# Patient Record
Sex: MALE | Race: WHITE | NOT HISPANIC OR LATINO | URBAN - METROPOLITAN AREA
[De-identification: names, ages, dates, MRNs, and addresses within clinical notes are randomized per-mention and may not be internally consistent; named-entity substitution may affect disease eponyms.]

---

## 2021-12-01 ENCOUNTER — INPATIENT (INPATIENT)
Facility: HOSPITAL | Age: 71
LOS: 7 days | Discharge: NURSING, REHAB OR LONG-TERM CARE | DRG: 956 | End: 2021-12-09
Attending: SURGERY | Admitting: SURGERY
Payer: MEDICARE

## 2021-12-01 VITALS
OXYGEN SATURATION: 81 % | RESPIRATION RATE: 38 BRPM | HEART RATE: 123 BPM | SYSTOLIC BLOOD PRESSURE: 158 MMHG | DIASTOLIC BLOOD PRESSURE: 84 MMHG

## 2021-12-01 DIAGNOSIS — S72.91XA UNSPECIFIED FRACTURE OF RIGHT FEMUR, INITIAL ENCOUNTER FOR CLOSED FRACTURE: ICD-10-CM

## 2021-12-01 LAB
ANION GAP SERPL CALC-SCNC: 16 MMOL/L — SIGNIFICANT CHANGE UP (ref 5–17)
APPEARANCE UR: CLEAR — SIGNIFICANT CHANGE UP
APTT BLD: 28.4 SEC — SIGNIFICANT CHANGE UP (ref 27.5–35.5)
BILIRUB UR-MCNC: NEGATIVE — SIGNIFICANT CHANGE UP
BUN SERPL-MCNC: 30 MG/DL — HIGH (ref 7–23)
CALCIUM SERPL-MCNC: 9.2 MG/DL — SIGNIFICANT CHANGE UP (ref 8.4–10.5)
CHLORIDE SERPL-SCNC: 94 MMOL/L — LOW (ref 96–108)
CO2 SERPL-SCNC: 21 MMOL/L — LOW (ref 22–31)
COLOR SPEC: YELLOW — SIGNIFICANT CHANGE UP
CREAT SERPL-MCNC: 1.04 MG/DL — SIGNIFICANT CHANGE UP (ref 0.5–1.3)
DIFF PNL FLD: NEGATIVE — SIGNIFICANT CHANGE UP
GAS PNL BLDA: SIGNIFICANT CHANGE UP
GAS PNL BLDA: SIGNIFICANT CHANGE UP
GLUCOSE SERPL-MCNC: 111 MG/DL — HIGH (ref 70–99)
GLUCOSE UR QL: NEGATIVE — SIGNIFICANT CHANGE UP
HCT VFR BLD CALC: 27.8 % — LOW (ref 39–50)
HCT VFR BLD CALC: 32.7 % — LOW (ref 39–50)
HGB BLD-MCNC: 10.8 G/DL — LOW (ref 13–17)
HGB BLD-MCNC: 9.2 G/DL — LOW (ref 13–17)
INR BLD: 0.97 RATIO — SIGNIFICANT CHANGE UP (ref 0.88–1.16)
KETONES UR-MCNC: NEGATIVE — SIGNIFICANT CHANGE UP
LEUKOCYTE ESTERASE UR-ACNC: NEGATIVE — SIGNIFICANT CHANGE UP
MAGNESIUM SERPL-MCNC: 1.9 MG/DL — SIGNIFICANT CHANGE UP (ref 1.6–2.6)
MCHC RBC-ENTMCNC: 27.8 PG — SIGNIFICANT CHANGE UP (ref 27–34)
MCHC RBC-ENTMCNC: 28 PG — SIGNIFICANT CHANGE UP (ref 27–34)
MCHC RBC-ENTMCNC: 33 GM/DL — SIGNIFICANT CHANGE UP (ref 32–36)
MCHC RBC-ENTMCNC: 33.1 GM/DL — SIGNIFICANT CHANGE UP (ref 32–36)
MCV RBC AUTO: 84 FL — SIGNIFICANT CHANGE UP (ref 80–100)
MCV RBC AUTO: 84.7 FL — SIGNIFICANT CHANGE UP (ref 80–100)
NITRITE UR-MCNC: NEGATIVE — SIGNIFICANT CHANGE UP
NRBC # BLD: 0 /100 WBCS — SIGNIFICANT CHANGE UP (ref 0–0)
NRBC # BLD: 0 /100 WBCS — SIGNIFICANT CHANGE UP (ref 0–0)
PH UR: 6 — SIGNIFICANT CHANGE UP (ref 5–8)
PHOSPHATE SERPL-MCNC: 4.3 MG/DL — SIGNIFICANT CHANGE UP (ref 2.5–4.5)
PLATELET # BLD AUTO: 336 K/UL — SIGNIFICANT CHANGE UP (ref 150–400)
PLATELET # BLD AUTO: 381 K/UL — SIGNIFICANT CHANGE UP (ref 150–400)
POTASSIUM SERPL-MCNC: 4.6 MMOL/L — SIGNIFICANT CHANGE UP (ref 3.5–5.3)
POTASSIUM SERPL-SCNC: 4.6 MMOL/L — SIGNIFICANT CHANGE UP (ref 3.5–5.3)
PROT UR-MCNC: ABNORMAL
PROTHROM AB SERPL-ACNC: 11.7 SEC — SIGNIFICANT CHANGE UP (ref 10.6–13.6)
RBC # BLD: 3.31 M/UL — LOW (ref 4.2–5.8)
RBC # BLD: 3.86 M/UL — LOW (ref 4.2–5.8)
RBC # FLD: 16.1 % — HIGH (ref 10.3–14.5)
RBC # FLD: 16.1 % — HIGH (ref 10.3–14.5)
SODIUM SERPL-SCNC: 131 MMOL/L — LOW (ref 135–145)
SP GR SPEC: >1.05 (ref 1.01–1.02)
UROBILINOGEN FLD QL: ABNORMAL
WBC # BLD: 19.82 K/UL — HIGH (ref 3.8–10.5)
WBC # BLD: 22.8 K/UL — HIGH (ref 3.8–10.5)
WBC # FLD AUTO: 19.82 K/UL — HIGH (ref 3.8–10.5)
WBC # FLD AUTO: 22.8 K/UL — HIGH (ref 3.8–10.5)

## 2021-12-01 PROCEDURE — 73551 X-RAY EXAM OF FEMUR 1: CPT | Mod: 26,RT

## 2021-12-01 PROCEDURE — 73501 X-RAY EXAM HIP UNI 1 VIEW: CPT | Mod: 26,RT

## 2021-12-01 PROCEDURE — 71045 X-RAY EXAM CHEST 1 VIEW: CPT | Mod: 26

## 2021-12-01 PROCEDURE — 99223 1ST HOSP IP/OBS HIGH 75: CPT

## 2021-12-01 RX ORDER — IPRATROPIUM BROMIDE 0.2 MG/ML
500 SOLUTION, NON-ORAL INHALATION EVERY 6 HOURS
Refills: 0 | Status: DISCONTINUED | OUTPATIENT
Start: 2021-12-01 | End: 2021-12-02

## 2021-12-01 RX ORDER — LIDOCAINE 4 G/100G
1 CREAM TOPICAL DAILY
Refills: 0 | Status: DISCONTINUED | OUTPATIENT
Start: 2021-12-01 | End: 2021-12-09

## 2021-12-01 RX ORDER — TRAMADOL HYDROCHLORIDE 50 MG/1
25 TABLET ORAL EVERY 6 HOURS
Refills: 0 | Status: DISCONTINUED | OUTPATIENT
Start: 2021-12-01 | End: 2021-12-07

## 2021-12-01 RX ORDER — TRAMADOL HYDROCHLORIDE 50 MG/1
50 TABLET ORAL EVERY 6 HOURS
Refills: 0 | Status: DISCONTINUED | OUTPATIENT
Start: 2021-12-01 | End: 2021-12-07

## 2021-12-01 RX ORDER — DIVALPROEX SODIUM 500 MG/1
1000 TABLET, DELAYED RELEASE ORAL EVERY 24 HOURS
Refills: 0 | Status: DISCONTINUED | OUTPATIENT
Start: 2021-12-01 | End: 2021-12-09

## 2021-12-01 RX ORDER — SODIUM CHLORIDE 9 MG/ML
1000 INJECTION INTRAMUSCULAR; INTRAVENOUS; SUBCUTANEOUS ONCE
Refills: 0 | Status: COMPLETED | OUTPATIENT
Start: 2021-12-01 | End: 2021-12-01

## 2021-12-01 RX ORDER — SODIUM CHLORIDE 9 MG/ML
1000 INJECTION INTRAMUSCULAR; INTRAVENOUS; SUBCUTANEOUS
Refills: 0 | Status: DISCONTINUED | OUTPATIENT
Start: 2021-12-01 | End: 2021-12-02

## 2021-12-01 RX ORDER — ACETAMINOPHEN 500 MG
1000 TABLET ORAL EVERY 6 HOURS
Refills: 0 | Status: COMPLETED | OUTPATIENT
Start: 2021-12-01 | End: 2021-12-02

## 2021-12-01 RX ORDER — BUDESONIDE, MICRONIZED 100 %
0.5 POWDER (GRAM) MISCELLANEOUS EVERY 12 HOURS
Refills: 0 | Status: DISCONTINUED | OUTPATIENT
Start: 2021-12-01 | End: 2021-12-06

## 2021-12-01 RX ORDER — POLYETHYLENE GLYCOL 3350 17 G/17G
17 POWDER, FOR SOLUTION ORAL AT BEDTIME
Refills: 0 | Status: DISCONTINUED | OUTPATIENT
Start: 2021-12-01 | End: 2021-12-02

## 2021-12-01 RX ORDER — HYDROMORPHONE HYDROCHLORIDE 2 MG/ML
0.25 INJECTION INTRAMUSCULAR; INTRAVENOUS; SUBCUTANEOUS ONCE
Refills: 0 | Status: DISCONTINUED | OUTPATIENT
Start: 2021-12-01 | End: 2021-12-01

## 2021-12-01 RX ORDER — IPRATROPIUM/ALBUTEROL SULFATE 18-103MCG
3 AEROSOL WITH ADAPTER (GRAM) INHALATION EVERY 6 HOURS
Refills: 0 | Status: DISCONTINUED | OUTPATIENT
Start: 2021-12-01 | End: 2021-12-02

## 2021-12-01 RX ORDER — SENNA PLUS 8.6 MG/1
1 TABLET ORAL AT BEDTIME
Refills: 0 | Status: DISCONTINUED | OUTPATIENT
Start: 2021-12-01 | End: 2021-12-02

## 2021-12-01 RX ORDER — OLANZAPINE 15 MG/1
15 TABLET, FILM COATED ORAL AT BEDTIME
Refills: 0 | Status: DISCONTINUED | OUTPATIENT
Start: 2021-12-01 | End: 2021-12-09

## 2021-12-01 RX ORDER — SODIUM CHLORIDE 9 MG/ML
1000 INJECTION, SOLUTION INTRAVENOUS
Refills: 0 | Status: DISCONTINUED | OUTPATIENT
Start: 2021-12-01 | End: 2021-12-01

## 2021-12-01 RX ORDER — OLANZAPINE 15 MG/1
1 TABLET, FILM COATED ORAL
Qty: 0 | Refills: 0 | DISCHARGE

## 2021-12-01 RX ORDER — NICOTINE POLACRILEX 2 MG
1 GUM BUCCAL DAILY
Refills: 0 | Status: COMPLETED | OUTPATIENT
Start: 2021-12-01 | End: 2021-12-07

## 2021-12-01 RX ORDER — DOCUSATE SODIUM 100 MG
200 CAPSULE ORAL
Qty: 0 | Refills: 0 | DISCHARGE

## 2021-12-01 RX ORDER — HYDROMORPHONE HYDROCHLORIDE 2 MG/ML
0.25 INJECTION INTRAMUSCULAR; INTRAVENOUS; SUBCUTANEOUS EVERY 4 HOURS
Refills: 0 | Status: DISCONTINUED | OUTPATIENT
Start: 2021-12-01 | End: 2021-12-07

## 2021-12-01 RX ORDER — AMLODIPINE BESYLATE 2.5 MG/1
1 TABLET ORAL
Qty: 0 | Refills: 0 | DISCHARGE

## 2021-12-01 RX ADMIN — Medication 1 PATCH: at 21:03

## 2021-12-01 RX ADMIN — SODIUM CHLORIDE 1000 MILLILITER(S): 9 INJECTION INTRAMUSCULAR; INTRAVENOUS; SUBCUTANEOUS at 21:10

## 2021-12-01 RX ADMIN — SODIUM CHLORIDE 100 MILLILITER(S): 9 INJECTION, SOLUTION INTRAVENOUS at 20:15

## 2021-12-01 RX ADMIN — Medication 3 MILLILITER(S): at 18:47

## 2021-12-01 RX ADMIN — DIVALPROEX SODIUM 1000 MILLIGRAM(S): 500 TABLET, DELAYED RELEASE ORAL at 21:04

## 2021-12-01 RX ADMIN — TRAMADOL HYDROCHLORIDE 50 MILLIGRAM(S): 50 TABLET ORAL at 20:15

## 2021-12-01 RX ADMIN — HYDROMORPHONE HYDROCHLORIDE 0.25 MILLIGRAM(S): 2 INJECTION INTRAMUSCULAR; INTRAVENOUS; SUBCUTANEOUS at 20:30

## 2021-12-01 RX ADMIN — TRAMADOL HYDROCHLORIDE 25 MILLIGRAM(S): 50 TABLET ORAL at 23:45

## 2021-12-01 RX ADMIN — Medication 1000 MILLIGRAM(S): at 23:49

## 2021-12-01 RX ADMIN — Medication 400 MILLIGRAM(S): at 23:44

## 2021-12-01 RX ADMIN — HYDROMORPHONE HYDROCHLORIDE 0.25 MILLIGRAM(S): 2 INJECTION INTRAMUSCULAR; INTRAVENOUS; SUBCUTANEOUS at 20:15

## 2021-12-01 RX ADMIN — OLANZAPINE 15 MILLIGRAM(S): 15 TABLET, FILM COATED ORAL at 21:03

## 2021-12-01 RX ADMIN — LIDOCAINE 1 PATCH: 4 CREAM TOPICAL at 21:10

## 2021-12-01 RX ADMIN — TRAMADOL HYDROCHLORIDE 50 MILLIGRAM(S): 50 TABLET ORAL at 19:44

## 2021-12-01 NOTE — CONSULT NOTE ADULT - ASSESSMENT
Patient is a 71y Male with PMH of Schizophrenia, HTN, current smoker (1/2pk a day), new dx of COPD who was initially admitted to [facility] after a fall on the sun deck at [facility] with cc of hip pain. Patient found to have Right comminuted Intertrochanteric fracture, and right rib fx, with hemopneumothorax and grade 3 liver laceration with hypoxia r/o PE. Patient admit to SICU for hemodynamic monitoring.    Neuro: hx of Schizophrenia  - resume home Olanzaprine, depakote  - acute pain control for rib fx: tylenol, tramadol and dilaudid prn  -monitor mental status    Resp: right rib fx, hypoxic resp failure  -had recent CTA no PE  - cxr daily  - encourage incentive spirometer use  - pulmicort and duoneb  - hypoxic reps failure, on HFNC titrate to O2 between 88-92  -chest PT  -OOB to chair  -HOB >35 degrees    CVS: hx of HTN  -BP borderline  -given 500cc bolus  -Lactic acid clear  -monitor BP HR  -holding home amlodipine and vasotec    GI:   -npo for poss ORIF of right hip   - bowel regimen    /Renal: grade 3 liver lac  - hyponatremia ? pre renal  - given 500cc fluids  -monitor renal function and electrolytes    Heme: grade 3 liver lac   - H/H stable  -will do serial cbc    ID:   - will sent UA  - follow temp and wbc    Endo:   -monitor glucose of bmp    case d/w Dr Perez

## 2021-12-01 NOTE — H&P ADULT - NSHPPHYSICALEXAM_GEN_ALL_CORE
PHYSICAL EXAM:  GENERAL: NAD, well-developed, well nourished  HEAD:  NC/AC  EYES: EOMI, PERRLA, conjunctiva and sclera clear  NECK: Supple, No JVD  CHEST/LUNG: b/l air entry, mild dyspnea  HEART: Regular rate and rhythm. No murmurs, rubs, or gallops.   ABDOMEN: Soft, Nontender, Nondistended. Bowel sounds present  EXTREMITIES:  2+ Peripheral Pulses, No clubbing, cyanosis, or edema  right leg: unable to move 2/2 hip fx, +pulse, no pitting edema  MS: AAOx3  NEUROLOGY: non-focal  SKIN: No rashes or lesions

## 2021-12-01 NOTE — H&P ADULT - HISTORY OF PRESENT ILLNESS
Patient is a 71y Male with PMH of Schizophrenia, HTN, current smoker (1/2pk a day), new dx of COPD who was initially admitted to [facility] after a fall on the sun deck at [facility] with cc of hip pain. Patient found to have Right comminuted Intertrochanteric fracture and was suppose to go for ORIF on 12/2. CXR showed mild right apical pneumothorax, persistent opacities. While on the floor, patient dessated, RRT was called, General surgery was called, patient then had serial CT done which revealed 4 right rib fx, with hemopneumothorax and pneumomediastinum and grade 3 liver laceration and no pulmonary embolism. Patient then transferred to [facility] trauma, for possible IR intervention for grade 3 liver laceration.   Patient transferred  to SICU, hypoxic on HFNC, c/o right hip pain.     Primary Survey:   Airway: intact, HFNC 100%/60L sating 88-95  Breathing: mildly tachypneic, b/l air entry, sats   88-95 on  HFNC 100%/60L  Circulation: mildly tachycardic between 100-105, 2 + pulses in all extremeties    Secondary  Survey:   HEENT: autramatic, normocephalic, no bleeding, non tender over head face  Neck: trachea in midline, no cervical tenderness, no pain to flexion, extension, rotation and side bending  Pulmo: some tendereness to right ribs, no echymosis, b.l breath sounds  Cardiac: tacycardic 100-105, NSR  Abdomen: soft, non tender, not distended, no echymosis  MSK: Right Hip TTP with limited ROM, soft compartments, LLE full ROM, full strenght

## 2021-12-01 NOTE — H&P ADULT - NSHPLABSRESULTS_GEN_ALL_CORE
.  LABS:                         10.8   22.80 )-----------( 381      ( 01 Dec 2021 19:19 )             32.7     12-01    131<L>  |  94<L>  |  30<H>  ----------------------------<  111<H>  4.6   |  21<L>  |  1.04    Ca    9.2      01 Dec 2021 19:19  Phos  4.3     12-01  Mg     1.9     12-01      PT/INR - ( 01 Dec 2021 19:19 )   PT: 11.7 sec;   INR: 0.97 ratio         PTT - ( 01 Dec 2021 19:19 )  PTT:28.4 sec          RADIOLOGY, EKG & ADDITIONAL TESTS: Reviewed.

## 2021-12-01 NOTE — CONSULT NOTE ADULT - SUBJECTIVE AND OBJECTIVE BOX
71yMale c/o R hip pain s/p mechanical fall. Initially presented to OSH and then transferred directly to SICU for respiratory distress. Patient denies head hit or LOC. Patient denies numbness or tingling in the RLE. Patient denies any other injuries.    PMH:  -HTN  -schizophrenia  -COPD, 0.5ppd smoking    PSH: none known    AH: NKDA    Meds: See med rec    T(C): 37.1 (12-01-21 @ 19:00)  HR: 107 (12-01-21 @ 21:00)  BP: 113/71 (12-01-21 @ 21:00)  RR: 28 (12-01-21 @ 21:00)  SpO2: 92% (12-01-21 @ 21:00)    PE  Gen: Laying in bed, alert and oriented, NAD  Resp: Unlabored breathing  RLE: Skin intact, no ecchymosis,   SILT DP/SP/ Adolfo/Saph/Post Tib  +EHL/FHL/TA/Gastroc,   Knee/ankle painless ROM,   hip ROM limited 2/2 pain,  DP+   Soft compartments, no calf ttp   +log roll, + heelstrike    Secondary:  No TTP over bony landmarks, SILT BL, ROM intact BL, distal pulses palpable.                        10.8   22.80 )-----------( 381      ( 01 Dec 2021 19:19 )             32.7     12-01    131<L>  |  94<L>  |  30<H>  ----------------------------<  111<H>  4.6   |  21<L>  |  1.04    Ca    9.2      01 Dec 2021 19:19  Phos  4.3     12-01  Mg     1.9     12-01      PT/INR - ( 01 Dec 2021 19:19 )   PT: 11.7 sec;   INR: 0.97 ratio    PTT - ( 01 Dec 2021 19:19 )  PTT:28.4 sec    Imaging:  XR demonstrating R IT fx      71yMale with R IT fx  - Pain control  - IS  - Continue home medications  - Regular Diet, NPOpMN/IVF  - CBC/BMP/Coags/UA/T+S x2  - EKG/CXR  - Medical clearance prior to planned procedure  - Plan for OR 12/2 for R femoral IMN 71yMale c/o R hip pain s/p mechanical fall. Initially presented to OSH and then transferred directly to SICU for respiratory distress. Patient denies head hit or LOC. Patient denies numbness or tingling in the RLE. Patient denies any other injuries.    PMH:  -HTN  -schizophrenia  -COPD, 0.5ppd smoking    PSH: none known    AH: NKDA    Meds: See med rec    T(C): 37.1 (12-01-21 @ 19:00)  HR: 107 (12-01-21 @ 21:00)  BP: 113/71 (12-01-21 @ 21:00)  RR: 28 (12-01-21 @ 21:00)  SpO2: 92% (12-01-21 @ 21:00)    PE  Gen: Laying in bed, alert and oriented, NAD  Resp: Unlabored breathing  RLE: Skin intact, no ecchymosis,   SILT DP/SP/ Adolfo/Saph/Post Tib  +EHL/FHL/TA/Gastroc,   Knee/ankle painless ROM,   hip ROM limited 2/2 pain,  DP+   Soft compartments, no calf ttp   +log roll, + heelstrike    Secondary:  No TTP over bony landmarks, SILT BL, ROM intact BL, distal pulses palpable.                        10.8   22.80 )-----------( 381      ( 01 Dec 2021 19:19 )             32.7     12-01    131<L>  |  94<L>  |  30<H>  ----------------------------<  111<H>  4.6   |  21<L>  |  1.04    Ca    9.2      01 Dec 2021 19:19  Phos  4.3     12-01  Mg     1.9     12-01      PT/INR - ( 01 Dec 2021 19:19 )   PT: 11.7 sec;   INR: 0.97 ratio    PTT - ( 01 Dec 2021 19:19 )  PTT:28.4 sec    Imaging:  XR demonstrating R IT fx      71yMale with R IT fx  - NWB RLE  - Pain control  - IS  - Continue home medications  - Regular Diet, NPOpMN/IVF  - CBC/BMP/Coags/UA/T+S x2  - EKG/CXR  - Medical clearance prior to planned procedure  - Plan for OR 12/2 for R femoral IMN 71yMale c/o R hip pain s/p mechanical fall. Initially presented to OSH and then transferred directly to SICU for respiratory distress. Patient denies head hit or LOC. Patient denies numbness or tingling in the RLE. Patient denies any other injuries.    PMH:  -HTN  -schizophrenia  -COPD, 0.5ppd smoking    PSH: none known    AH: NKDA    Meds: See med rec    T(C): 37.1 (12-01-21 @ 19:00)  HR: 107 (12-01-21 @ 21:00)  BP: 113/71 (12-01-21 @ 21:00)  RR: 28 (12-01-21 @ 21:00)  SpO2: 92% (12-01-21 @ 21:00)    PE  Gen: Laying in bed, alert and oriented, NAD  Resp: Unlabored breathing  RLE: Skin intact, no ecchymosis, shortened and externally rotated  SILT DP/SP/ Adolfo/Saph/Post Tib  +EHL/FHL/TA/Gastroc,   Knee/ankle painless ROM,   hip ROM limited 2/2 pain,  DP+   Soft compartments, no calf ttp   +log roll, + heelstrike    Secondary:  No TTP over bony landmarks, SILT BL, ROM intact BL, distal pulses palpable.                        10.8   22.80 )-----------( 381      ( 01 Dec 2021 19:19 )             32.7     12-01    131<L>  |  94<L>  |  30<H>  ----------------------------<  111<H>  4.6   |  21<L>  |  1.04    Ca    9.2      01 Dec 2021 19:19  Phos  4.3     12-01  Mg     1.9     12-01      PT/INR - ( 01 Dec 2021 19:19 )   PT: 11.7 sec;   INR: 0.97 ratio    PTT - ( 01 Dec 2021 19:19 )  PTT:28.4 sec    Imaging:  XR demonstrating R IT fx      71yMale with R IT fx  - NWB RLE  - Pain control  - IS  - Continue home medications  - Regular Diet, NPOpMN/IVF  - CBC/BMP/Coags/UA/T+S x2  - EKG/CXR  - Medical clearance prior to planned procedure  - Plan for OR 12/2 for R femoral IMN 71yMale c/o R hip pain s/p mechanical fall. Initially presented to [facility] and then transferred directly to SICU for respiratory distress. Patient denies head hit or LOC. Patient denies numbness or tingling in the RLE. Patient denies any other injuries.    PMH:  -HTN  -schizophrenia  -COPD, 0.5ppd smoking    PSH: none known    AH: NKDA    Meds: See med rec    T(C): 37.1 (12-01-21 @ 19:00)  HR: 107 (12-01-21 @ 21:00)  BP: 113/71 (12-01-21 @ 21:00)  RR: 28 (12-01-21 @ 21:00)  SpO2: 92% (12-01-21 @ 21:00)    PE  Gen: Laying in bed, alert and oriented, NAD  Resp: HFNC  RLE: Skin intact, no ecchymosis, shortened and externally rotated  SILT DP/SP/ Adolfo/Saph/Post Tib  +EHL/FHL/TA/Gastroc,   Knee/ankle painless ROM,   hip ROM limited 2/2 pain,  DP+   Soft compartments, no calf ttp   +log roll, + heelstrike    Secondary:  No TTP over bony landmarks, SILT BL, ROM intact BL, distal pulses palpable.                        10.8   22.80 )-----------( 381      ( 01 Dec 2021 19:19 )             32.7     12-01    131<L>  |  94<L>  |  30<H>  ----------------------------<  111<H>  4.6   |  21<L>  |  1.04    Ca    9.2      01 Dec 2021 19:19  Phos  4.3     12-01  Mg     1.9     12-01      PT/INR - ( 01 Dec 2021 19:19 )   PT: 11.7 sec;   INR: 0.97 ratio    PTT - ( 01 Dec 2021 19:19 )  PTT:28.4 sec    Imaging:  XR demonstrating R IT fx      71yMale with R IT fx    -Medical/SICU management appreciated - please document medical clearance/optimization for OR  Imaging reviewed with R IT fracture  - NWB RLE/Strict Bedrest  - Pain control PRN  - Incentive Spirometry  - Continue home medications  - NPO after midnight except medications for OR; IVF while NPO  Hold DVT ppx after midnight for OR; SCDs okay  - Preop labs/T&S/COVID/EKG/CXR ordered  - Plan for OR 12/2 for R femoral IMN wtih Dr. Powers  Will discuss with attending and advise if any changes to plan

## 2021-12-01 NOTE — CONSULT NOTE ADULT - SUBJECTIVE AND OBJECTIVE BOX
HISTORY OF PRESENT ILLNESS:  Patient is a 71y Male     PAST MEDICAL HISTORY:     PAST SURGICAL HISTORY:     FAMILY HISTORY:     SOCIAL HISTORY:    CODE STATUS:     HOME MEDICATIONS:    ALLERGIES: No Known Allergies      VITAL SIGNS:  T(C): 36.8 (01 Dec 2021 18:20), Max: 36.8 (01 Dec 2021 18:20)  T(F): 98.2 (01 Dec 2021 18:20), Max: 98.2 (01 Dec 2021 18:20)  HR: 123 (01 Dec 2021 18:20) (123 - 123)  BP: 158/84 (01 Dec 2021 18:20) (158/84 - 158/84)  BP(mean): 113 (01 Dec 2021 18:20) (113 - 113)  RR: 38 (01 Dec 2021 18:20) (38 - 38)  SpO2: 81% (01 Dec 2021 18:20) (81% - 81%)      NEURO  Exam:  acetaminophen   IVPB .. 1000 milliGRAM(s) IV Intermittent every 6 hours  diVALproex ER 1000 milliGRAM(s) Oral every 24 hours  OLANZapine 15 milliGRAM(s) Oral at bedtime  traMADol 25 milliGRAM(s) Oral every 6 hours PRN Moderate Pain (4 - 6)  traMADol 50 milliGRAM(s) Oral every 6 hours PRN Severe Pain (7 - 10)      RESPIRATORY  Mechanical Ventilation:     Exam:  albuterol/ipratropium for Nebulization 3 milliLiter(s) Nebulizer every 6 hours  buDESOnide    Inhalation Suspension 0.5 milliGRAM(s) Inhalation every 12 hours  ipratropium    for Nebulization 500 MICROGram(s) Nebulizer every 6 hours      CARDIOVASCULAR    Exam:  Cardiac Rhythm:      GI/NUTRITION  Exam:  Diet:      GENITOURINARY/RENAL  lactated ringers. 1000 milliLiter(s) IV Continuous <Continuous>              [ ] Ybarra catheter, indication: urine output monitoring in critically ill patient    HEMATOLOGIC  [ ] VTE Prophylaxis:        Transfusion: [ ] PRBC	[ ] Platelets	[ ] FFP	[ ] Cryoprecipitate      INFECTIOUS DISEASES    RECENT CULTURES:      ENDOCRINE    CAPILLARY BLOOD GLUCOSE          PATIENT CARE ACCESS DEVICES:  [x ] Peripheral IV  [ ] Central Venous Line	[ ] R	[ ] L	[ ] IJ	[ ] Fem	[ ] SC	Placed:   [ ] Arterial Line		[ ] R	[ ] L	[ ] Fem	[ ] Rad	[ ] Ax	Placed:   [ ] PICC:					[ ] Mediport  [ ] Urinary Catheter, Date Placed:   [x] Necessity of urinary, arterial, and venous catheters discussed    OTHER MEDICATIONS: lidocaine   4% Patch 1 Patch Transdermal daily  nicotine -  14 mG/24Hr(s) Patch 1 patch Transdermal daily      IMAGING STUDIES: HISTORY OF PRESENT ILLNESS:  Patient is a 71y Male with PMH of Schizophrenia, HTN, current smoker (1/2pk a day), new dx of COPD who was initially admitted to [facility] after a fall on the sun deck at [facility] with cc of hip pain. Patient found to have Right comminuted Intertrochanteric fracture and was suppose to go for ORIF on 12/2. CXR showed mild right apical pneumothorax, persistent opacities. While on the floor, patient dessated,     PAST MEDICAL HISTORY:   Schizophrenia  HTN  COPD    PAST SURGICAL HISTORY:   None    FAMILY HISTORY:   none    SOCIAL HISTORY:  single, only family is sister   active smoker 1/2 pk a day for over 25 years  CODE STATUS:     HOME MEDICATIONS:    ALLERGIES: No Known Allergies      VITAL SIGNS:  T(C): 36.8 (01 Dec 2021 18:20), Max: 36.8 (01 Dec 2021 18:20)  T(F): 98.2 (01 Dec 2021 18:20), Max: 98.2 (01 Dec 2021 18:20)  HR: 123 (01 Dec 2021 18:20) (123 - 123)  BP: 158/84 (01 Dec 2021 18:20) (158/84 - 158/84)  BP(mean): 113 (01 Dec 2021 18:20) (113 - 113)  RR: 38 (01 Dec 2021 18:20) (38 - 38)  SpO2: 81% (01 Dec 2021 18:20) (81% - 81%)      NEURO  Exam:  acetaminophen   IVPB .. 1000 milliGRAM(s) IV Intermittent every 6 hours  diVALproex ER 1000 milliGRAM(s) Oral every 24 hours  OLANZapine 15 milliGRAM(s) Oral at bedtime  traMADol 25 milliGRAM(s) Oral every 6 hours PRN Moderate Pain (4 - 6)  traMADol 50 milliGRAM(s) Oral every 6 hours PRN Severe Pain (7 - 10)      RESPIRATORY  Mechanical Ventilation:     Exam:  albuterol/ipratropium for Nebulization 3 milliLiter(s) Nebulizer every 6 hours  buDESOnide    Inhalation Suspension 0.5 milliGRAM(s) Inhalation every 12 hours  ipratropium    for Nebulization 500 MICROGram(s) Nebulizer every 6 hours      CARDIOVASCULAR    Exam:  Cardiac Rhythm:      GI/NUTRITION  Exam:  Diet:      GENITOURINARY/RENAL  lactated ringers. 1000 milliLiter(s) IV Continuous <Continuous>              [ ] Ybarra catheter, indication: urine output monitoring in critically ill patient    HEMATOLOGIC  [ ] VTE Prophylaxis:        Transfusion: [ ] PRBC	[ ] Platelets	[ ] FFP	[ ] Cryoprecipitate      INFECTIOUS DISEASES    RECENT CULTURES:      ENDOCRINE    CAPILLARY BLOOD GLUCOSE          PATIENT CARE ACCESS DEVICES:  [x ] Peripheral IV  [ ] Central Venous Line	[ ] R	[ ] L	[ ] IJ	[ ] Fem	[ ] SC	Placed:   [ ] Arterial Line		[ ] R	[ ] L	[ ] Fem	[ ] Rad	[ ] Ax	Placed:   [ ] PICC:					[ ] Mediport  [ ] Urinary Catheter, Date Placed:   [x] Necessity of urinary, arterial, and venous catheters discussed    OTHER MEDICATIONS: lidocaine   4% Patch 1 Patch Transdermal daily  nicotine -  14 mG/24Hr(s) Patch 1 patch Transdermal daily      IMAGING STUDIES: HISTORY OF PRESENT ILLNESS:  Patient is a 71y Male with PMH of Schizophrenia, HTN, current smoker (1/2pk a day), new dx of COPD who was initially admitted to [facility] after a fall on the sun deck at [facility] with cc of hip pain. Patient found to have Right comminuted Intertrochanteric fracture and was suppose to go for ORIF on 12/2. CXR showed mild right apical pneumothorax, persistent opacities. While on the floor, patient dessated, RRT was called, General surgery was called, patient then had serial CT done which revealed 4 right rib fx, with hemopneumothorax and pneumomediastinum and grade 3 liver laceration and no pulmonary embolism. Patient then transferred to [facility] trauma, for possible IR intervention for grade 3 liver laceration.   Patient admitted to SICU, hypoxic on HFNC, c/o right hip pain.     PAST MEDICAL HISTORY:   Schizophrenia  HTN  COPD    PAST SURGICAL HISTORY:   None    FAMILY HISTORY:   none    SOCIAL HISTORY:  single, only family is sister   active smoker 1/2 pk a day for over 25 years  CODE STATUS:     HOME MEDICATIONS:    ALLERGIES: No Known Allergies      VITAL SIGNS:  T(C): 36.8 (01 Dec 2021 18:20), Max: 36.8 (01 Dec 2021 18:20)  T(F): 98.2 (01 Dec 2021 18:20), Max: 98.2 (01 Dec 2021 18:20)  HR: 123 (01 Dec 2021 18:20) (123 - 123)  BP: 158/84 (01 Dec 2021 18:20) (158/84 - 158/84)  BP(mean): 113 (01 Dec 2021 18:20) (113 - 113)  RR: 38 (01 Dec 2021 18:20) (38 - 38)  SpO2: 81% (01 Dec 2021 18:20) (81% - 81%)      NEURO  Exam: awake and alert  acetaminophen   IVPB .. 1000 milliGRAM(s) IV Intermittent every 6 hours  diVALproex ER 1000 milliGRAM(s) Oral every 24 hours  OLANZapine 15 milliGRAM(s) Oral at bedtime  traMADol 25 milliGRAM(s) Oral every 6 hours PRN Moderate Pain (4 - 6)  traMADol 50 milliGRAM(s) Oral every 6 hours PRN Severe Pain (7 - 10)      RESPIRATORY  Exam:  b/l air entry  albuterol/ipratropium for Nebulization 3 milliLiter(s) Nebulizer every 6 hours  buDESOnide    Inhalation Suspension 0.5 milliGRAM(s) Inhalation every 12 hours  ipratropium    for Nebulization 500 MICROGram(s) Nebulizer every 6 hours      CARDIOVASCULAR  Exam: s1s2, rrr  Cardiac Rhythm: sinus      GI/NUTRITION  Exam: NT  Diet:       GENITOURINARY/RENAL  lactated ringers. 1000 milliLiter(s) IV Continuous <Continuous>              [ ] Ybarra catheter, indication: urine output monitoring in critically ill patient    HEMATOLOGIC  [ ] VTE Prophylaxis:        Transfusion: [ ] PRBC	[ ] Platelets	[ ] FFP	[ ] Cryoprecipitate      INFECTIOUS DISEASES    RECENT CULTURES:      ENDOCRINE    CAPILLARY BLOOD GLUCOSE          PATIENT CARE ACCESS DEVICES:  [x ] Peripheral IV  [ ] Central Venous Line	[ ] R	[ ] L	[ ] IJ	[ ] Fem	[ ] SC	Placed:   [ ] Arterial Line		[ ] R	[ ] L	[ ] Fem	[ ] Rad	[ ] Ax	Placed:   [ ] PICC:					[ ] Mediport  [ ] Urinary Catheter, Date Placed:   [x] Necessity of urinary, arterial, and venous catheters discussed    OTHER MEDICATIONS: lidocaine   4% Patch 1 Patch Transdermal daily  nicotine -  14 mG/24Hr(s) Patch 1 patch Transdermal daily      IMAGING STUDIES: HISTORY OF PRESENT ILLNESS:  Patient is a 71y Male with PMH of Schizophrenia, HTN, current smoker (1/2pk a day), new dx of COPD who was initially admitted to [facility] after a fall on the sun deck at [facility] with cc of hip pain. Patient found to have Right comminuted Intertrochanteric fracture and was suppose to go for ORIF on 12/2. CXR showed mild right apical pneumothorax, persistent opacities. While on the floor, patient dessated, RRT was called, General surgery was called, patient then had serial CT done which revealed 4 right rib fx, with hemopneumothorax and pneumomediastinum and grade 3 liver laceration and no pulmonary embolism. Patient then transferred to [facility] trauma, for possible IR intervention for grade 3 liver laceration.   Patient admitted to SICU, hypoxic on HFNC, c/o right hip pain.     PAST MEDICAL HISTORY:   Schizophrenia  HTN  COPD    PAST SURGICAL HISTORY:   None    FAMILY HISTORY:   none    SOCIAL HISTORY:  single, only family is sister   active smoker 1/2 pk a day for over 25 years  CODE STATUS:     HOME MEDICATIONS:    ALLERGIES: No Known Allergies      VITAL SIGNS:  T(C): 36.8 (01 Dec 2021 18:20), Max: 36.8 (01 Dec 2021 18:20)  T(F): 98.2 (01 Dec 2021 18:20), Max: 98.2 (01 Dec 2021 18:20)  HR: 123 (01 Dec 2021 18:20) (123 - 123)  BP: 158/84 (01 Dec 2021 18:20) (158/84 - 158/84)  BP(mean): 113 (01 Dec 2021 18:20) (113 - 113)  RR: 38 (01 Dec 2021 18:20) (38 - 38)  SpO2: 81% (01 Dec 2021 18:20) (81% - 81%)      NEURO  Exam: awake and alert  acetaminophen   IVPB .. 1000 milliGRAM(s) IV Intermittent every 6 hours  diVALproex ER 1000 milliGRAM(s) Oral every 24 hours  OLANZapine 15 milliGRAM(s) Oral at bedtime  traMADol 25 milliGRAM(s) Oral every 6 hours PRN Moderate Pain (4 - 6)  traMADol 50 milliGRAM(s) Oral every 6 hours PRN Severe Pain (7 - 10)      RESPIRATORY  Exam:  b/l air entry  albuterol/ipratropium for Nebulization 3 milliLiter(s) Nebulizer every 6 hours  buDESOnide    Inhalation Suspension 0.5 milliGRAM(s) Inhalation every 12 hours  ipratropium    for Nebulization 500 MICROGram(s) Nebulizer every 6 hours      CARDIOVASCULAR  Exam: s1s2, rrr  Cardiac Rhythm: sinus      GI/NUTRITION  Exam: NT  Diet: npo      GENITOURINARY/RENAL  lactated ringers. 1000 milliLiter(s) IV Continuous <Continuous>        [ ] Ybarra catheter, indication: urine output monitoring in critically ill patient    HEMATOLOGIC  [ ] VTE Prophylaxis:        Transfusion: [ ] PRBC	[ ] Platelets	[ ] FFP	[ ] Cryoprecipitate      INFECTIOUS DISEASES    RECENT CULTURES:      ENDOCRINE    CAPILLARY BLOOD GLUCOSE          PATIENT CARE ACCESS DEVICES:  [x ] Peripheral IV  [ ] Central Venous Line	[ ] R	[ ] L	[ ] IJ	[ ] Fem	[ ] SC	Placed:   [ ] Arterial Line		[ ] R	[ ] L	[ ] Fem	[ ] Rad	[ ] Ax	Placed:   [ ] PICC:					[ ] Mediport  [ ] Urinary Catheter, Date Placed:   [x] Necessity of urinary, arterial, and venous catheters discussed    OTHER MEDICATIONS: lidocaine   4% Patch 1 Patch Transdermal daily  nicotine -  14 mG/24Hr(s) Patch 1 patch Transdermal daily      IMAGING STUDIES:

## 2021-12-01 NOTE — H&P ADULT - ASSESSMENT
Patient is a 71y Male with PMH of Schizophrenia, HTN, current smoker (1/2pk a day), new dx of COPD who was initially admitted to [facility] after a fall on the sun deck at [facility] with cc of hip pain. Patient found to have Right comminuted Intertrochanteric fracture and was suppose to go for ORIF on 12/2. CXR showed mild right apical pneumothorax, persistent opacities. While on the floor, patient dessated, RRT was called, General surgery was called, patient then had serial CT done which revealed 4 right rib fx, with hemopneumothorax and pneumomediastinum and grade 3 liver laceration and no pulmonary embolism. Patient then transferred to [facility] trauma, for possible IR intervention for grade 3 liver laceration.     - - Admit to trauma in SICU, Dr. Berry  - F/u trauma pan-CT scans, sent over from [facility]  - serial cbc for liver laceration  - continue HFNC for hypoxia  - pain control for rib fx  - will call ortho  - Full plan pending results of imaging  - Tertiary survey

## 2021-12-02 DIAGNOSIS — I10 ESSENTIAL (PRIMARY) HYPERTENSION: ICD-10-CM

## 2021-12-02 DIAGNOSIS — J96.01 ACUTE RESPIRATORY FAILURE WITH HYPOXIA: ICD-10-CM

## 2021-12-02 DIAGNOSIS — S72.141A DISPLACED INTERTROCHANTERIC FRACTURE OF RIGHT FEMUR, INITIAL ENCOUNTER FOR CLOSED FRACTURE: ICD-10-CM

## 2021-12-02 DIAGNOSIS — E87.1 HYPO-OSMOLALITY AND HYPONATREMIA: ICD-10-CM

## 2021-12-02 DIAGNOSIS — F20.9 SCHIZOPHRENIA, UNSPECIFIED: ICD-10-CM

## 2021-12-02 DIAGNOSIS — S36.113A LACERATION OF LIVER, UNSPECIFIED DEGREE, INITIAL ENCOUNTER: ICD-10-CM

## 2021-12-02 LAB
ALBUMIN SERPL ELPH-MCNC: 3.1 G/DL — LOW (ref 3.3–5)
ALP SERPL-CCNC: 59 U/L — SIGNIFICANT CHANGE UP (ref 40–120)
ALT FLD-CCNC: 22 U/L — SIGNIFICANT CHANGE UP (ref 10–45)
ANION GAP SERPL CALC-SCNC: 11 MMOL/L — SIGNIFICANT CHANGE UP (ref 5–17)
APTT BLD: 28.9 SEC — SIGNIFICANT CHANGE UP (ref 27.5–35.5)
AST SERPL-CCNC: 26 U/L — SIGNIFICANT CHANGE UP (ref 10–40)
BILIRUB DIRECT SERPL-MCNC: 0.2 MG/DL — SIGNIFICANT CHANGE UP (ref 0–0.3)
BILIRUB INDIRECT FLD-MCNC: 0.2 MG/DL — SIGNIFICANT CHANGE UP (ref 0.2–1)
BILIRUB SERPL-MCNC: 0.4 MG/DL — SIGNIFICANT CHANGE UP (ref 0.2–1.2)
BLD GP AB SCN SERPL QL: NEGATIVE — SIGNIFICANT CHANGE UP
BUN SERPL-MCNC: 30 MG/DL — HIGH (ref 7–23)
CALCIUM SERPL-MCNC: 8.4 MG/DL — SIGNIFICANT CHANGE UP (ref 8.4–10.5)
CHLORIDE SERPL-SCNC: 98 MMOL/L — SIGNIFICANT CHANGE UP (ref 96–108)
CK SERPL-CCNC: 415 U/L — HIGH (ref 30–200)
CO2 SERPL-SCNC: 20 MMOL/L — LOW (ref 22–31)
CREAT ?TM UR-MCNC: 93 MG/DL — SIGNIFICANT CHANGE UP
CREAT SERPL-MCNC: 0.96 MG/DL — SIGNIFICANT CHANGE UP (ref 0.5–1.3)
GAS PNL BLDA: SIGNIFICANT CHANGE UP
GAS PNL BLDA: SIGNIFICANT CHANGE UP
GLUCOSE SERPL-MCNC: 117 MG/DL — HIGH (ref 70–99)
GRAM STN FLD: SIGNIFICANT CHANGE UP
HCT VFR BLD CALC: 26.8 % — LOW (ref 39–50)
HCT VFR BLD CALC: 27 % — LOW (ref 39–50)
HCV AB S/CO SERPL IA: 0.09 S/CO — SIGNIFICANT CHANGE UP (ref 0–0.99)
HCV AB SERPL-IMP: SIGNIFICANT CHANGE UP
HGB BLD-MCNC: 8.9 G/DL — LOW (ref 13–17)
HGB BLD-MCNC: 9.1 G/DL — LOW (ref 13–17)
INR BLD: 1.03 RATIO — SIGNIFICANT CHANGE UP (ref 0.88–1.16)
MAGNESIUM SERPL-MCNC: 1.8 MG/DL — SIGNIFICANT CHANGE UP (ref 1.6–2.6)
MCHC RBC-ENTMCNC: 27.9 PG — SIGNIFICANT CHANGE UP (ref 27–34)
MCHC RBC-ENTMCNC: 28.4 PG — SIGNIFICANT CHANGE UP (ref 27–34)
MCHC RBC-ENTMCNC: 33.2 GM/DL — SIGNIFICANT CHANGE UP (ref 32–36)
MCHC RBC-ENTMCNC: 33.7 GM/DL — SIGNIFICANT CHANGE UP (ref 32–36)
MCV RBC AUTO: 84 FL — SIGNIFICANT CHANGE UP (ref 80–100)
MCV RBC AUTO: 84.4 FL — SIGNIFICANT CHANGE UP (ref 80–100)
NRBC # BLD: 0 /100 WBCS — SIGNIFICANT CHANGE UP (ref 0–0)
NRBC # BLD: 0 /100 WBCS — SIGNIFICANT CHANGE UP (ref 0–0)
OSMOLALITY SERPL: 281 MOSMOL/KG — SIGNIFICANT CHANGE UP (ref 280–301)
OSMOLALITY UR: 582 MOS/KG — SIGNIFICANT CHANGE UP (ref 300–900)
PHOSPHATE SERPL-MCNC: 3.9 MG/DL — SIGNIFICANT CHANGE UP (ref 2.5–4.5)
PLATELET # BLD AUTO: 327 K/UL — SIGNIFICANT CHANGE UP (ref 150–400)
PLATELET # BLD AUTO: 336 K/UL — SIGNIFICANT CHANGE UP (ref 150–400)
POTASSIUM SERPL-MCNC: 4.5 MMOL/L — SIGNIFICANT CHANGE UP (ref 3.5–5.3)
POTASSIUM SERPL-SCNC: 4.5 MMOL/L — SIGNIFICANT CHANGE UP (ref 3.5–5.3)
PROT SERPL-MCNC: 5.7 G/DL — LOW (ref 6–8.3)
PROTHROM AB SERPL-ACNC: 12.3 SEC — SIGNIFICANT CHANGE UP (ref 10.6–13.6)
RBC # BLD: 3.19 M/UL — LOW (ref 4.2–5.8)
RBC # BLD: 3.2 M/UL — LOW (ref 4.2–5.8)
RBC # FLD: 16 % — HIGH (ref 10.3–14.5)
RBC # FLD: 16.1 % — HIGH (ref 10.3–14.5)
RH IG SCN BLD-IMP: POSITIVE — SIGNIFICANT CHANGE UP
SARS-COV-2 RNA SPEC QL NAA+PROBE: SIGNIFICANT CHANGE UP
SODIUM SERPL-SCNC: 129 MMOL/L — LOW (ref 135–145)
SODIUM UR-SCNC: 9 MMOL/L — SIGNIFICANT CHANGE UP
SPECIMEN SOURCE: SIGNIFICANT CHANGE UP
WBC # BLD: 19.36 K/UL — HIGH (ref 3.8–10.5)
WBC # BLD: 19.77 K/UL — HIGH (ref 3.8–10.5)
WBC # FLD AUTO: 19.36 K/UL — HIGH (ref 3.8–10.5)
WBC # FLD AUTO: 19.77 K/UL — HIGH (ref 3.8–10.5)

## 2021-12-02 PROCEDURE — 72125 CT NECK SPINE W/O DYE: CPT | Mod: 26

## 2021-12-02 PROCEDURE — 99223 1ST HOSP IP/OBS HIGH 75: CPT

## 2021-12-02 PROCEDURE — 93010 ELECTROCARDIOGRAM REPORT: CPT

## 2021-12-02 PROCEDURE — 70450 CT HEAD/BRAIN W/O DYE: CPT | Mod: 26

## 2021-12-02 PROCEDURE — 73501 X-RAY EXAM HIP UNI 1 VIEW: CPT | Mod: 26,RT

## 2021-12-02 PROCEDURE — 74177 CT ABD & PELVIS W/CONTRAST: CPT | Mod: 26

## 2021-12-02 PROCEDURE — 99233 SBSQ HOSP IP/OBS HIGH 50: CPT

## 2021-12-02 PROCEDURE — 73551 X-RAY EXAM OF FEMUR 1: CPT | Mod: 26,RT

## 2021-12-02 PROCEDURE — 71045 X-RAY EXAM CHEST 1 VIEW: CPT | Mod: 26

## 2021-12-02 PROCEDURE — 71260 CT THORAX DX C+: CPT | Mod: 26

## 2021-12-02 RX ORDER — AZITHROMYCIN 500 MG/1
500 TABLET, FILM COATED ORAL ONCE
Refills: 0 | Status: DISCONTINUED | OUTPATIENT
Start: 2021-12-02 | End: 2021-12-02

## 2021-12-02 RX ORDER — CEFTRIAXONE 500 MG/1
1000 INJECTION, POWDER, FOR SOLUTION INTRAMUSCULAR; INTRAVENOUS ONCE
Refills: 0 | Status: COMPLETED | OUTPATIENT
Start: 2021-12-02 | End: 2021-12-02

## 2021-12-02 RX ORDER — IPRATROPIUM/ALBUTEROL SULFATE 18-103MCG
3 AEROSOL WITH ADAPTER (GRAM) INHALATION ONCE
Refills: 0 | Status: COMPLETED | OUTPATIENT
Start: 2021-12-02 | End: 2021-12-02

## 2021-12-02 RX ORDER — CEFTRIAXONE 500 MG/1
1000 INJECTION, POWDER, FOR SOLUTION INTRAMUSCULAR; INTRAVENOUS EVERY 24 HOURS
Refills: 0 | Status: DISCONTINUED | OUTPATIENT
Start: 2021-12-03 | End: 2021-12-04

## 2021-12-02 RX ORDER — AZITHROMYCIN 500 MG/1
250 TABLET, FILM COATED ORAL EVERY 24 HOURS
Refills: 0 | Status: DISCONTINUED | OUTPATIENT
Start: 2021-12-03 | End: 2021-12-04

## 2021-12-02 RX ORDER — IPRATROPIUM/ALBUTEROL SULFATE 18-103MCG
3 AEROSOL WITH ADAPTER (GRAM) INHALATION EVERY 4 HOURS
Refills: 0 | Status: DISCONTINUED | OUTPATIENT
Start: 2021-12-02 | End: 2021-12-06

## 2021-12-02 RX ORDER — ENOXAPARIN SODIUM 100 MG/ML
40 INJECTION SUBCUTANEOUS EVERY 24 HOURS
Refills: 0 | Status: DISCONTINUED | OUTPATIENT
Start: 2021-12-02 | End: 2021-12-04

## 2021-12-02 RX ORDER — CHLORHEXIDINE GLUCONATE 213 G/1000ML
1 SOLUTION TOPICAL DAILY
Refills: 0 | Status: DISCONTINUED | OUTPATIENT
Start: 2021-12-02 | End: 2021-12-02

## 2021-12-02 RX ORDER — AZITHROMYCIN 500 MG/1
500 TABLET, FILM COATED ORAL ONCE
Refills: 0 | Status: COMPLETED | OUTPATIENT
Start: 2021-12-02 | End: 2021-12-02

## 2021-12-02 RX ORDER — CEFTRIAXONE 500 MG/1
INJECTION, POWDER, FOR SOLUTION INTRAMUSCULAR; INTRAVENOUS
Refills: 0 | Status: DISCONTINUED | OUTPATIENT
Start: 2021-12-02 | End: 2021-12-04

## 2021-12-02 RX ORDER — CHLORHEXIDINE GLUCONATE 213 G/1000ML
1 SOLUTION TOPICAL
Refills: 0 | Status: DISCONTINUED | OUTPATIENT
Start: 2021-12-03 | End: 2021-12-09

## 2021-12-02 RX ORDER — ACETAMINOPHEN 500 MG
1000 TABLET ORAL EVERY 6 HOURS
Refills: 0 | Status: COMPLETED | OUTPATIENT
Start: 2021-12-02 | End: 2021-12-05

## 2021-12-02 RX ORDER — SODIUM CHLORIDE 9 MG/ML
1000 INJECTION, SOLUTION INTRAVENOUS
Refills: 0 | Status: DISCONTINUED | OUTPATIENT
Start: 2021-12-02 | End: 2021-12-03

## 2021-12-02 RX ORDER — AZITHROMYCIN 500 MG/1
TABLET, FILM COATED ORAL
Refills: 0 | Status: DISCONTINUED | OUTPATIENT
Start: 2021-12-02 | End: 2021-12-04

## 2021-12-02 RX ORDER — SENNA PLUS 8.6 MG/1
2 TABLET ORAL AT BEDTIME
Refills: 0 | Status: DISCONTINUED | OUTPATIENT
Start: 2021-12-02 | End: 2021-12-09

## 2021-12-02 RX ORDER — POLYETHYLENE GLYCOL 3350 17 G/17G
17 POWDER, FOR SOLUTION ORAL DAILY
Refills: 0 | Status: DISCONTINUED | OUTPATIENT
Start: 2021-12-02 | End: 2021-12-06

## 2021-12-02 RX ORDER — HYDROCORTISONE 20 MG
20 TABLET ORAL EVERY 8 HOURS
Refills: 0 | Status: DISCONTINUED | OUTPATIENT
Start: 2021-12-02 | End: 2021-12-02

## 2021-12-02 RX ADMIN — Medication 1 PATCH: at 22:31

## 2021-12-02 RX ADMIN — SENNA PLUS 2 TABLET(S): 8.6 TABLET ORAL at 22:39

## 2021-12-02 RX ADMIN — Medication 1 PATCH: at 19:06

## 2021-12-02 RX ADMIN — Medication 3 MILLILITER(S): at 00:15

## 2021-12-02 RX ADMIN — TRAMADOL HYDROCHLORIDE 50 MILLIGRAM(S): 50 TABLET ORAL at 03:00

## 2021-12-02 RX ADMIN — Medication 400 MILLIGRAM(S): at 06:05

## 2021-12-02 RX ADMIN — TRAMADOL HYDROCHLORIDE 25 MILLIGRAM(S): 50 TABLET ORAL at 00:30

## 2021-12-02 RX ADMIN — Medication 3 MILLILITER(S): at 17:38

## 2021-12-02 RX ADMIN — LIDOCAINE 1 PATCH: 4 CREAM TOPICAL at 22:40

## 2021-12-02 RX ADMIN — Medication 0.5 MILLIGRAM(S): at 17:38

## 2021-12-02 RX ADMIN — HYDROMORPHONE HYDROCHLORIDE 0.25 MILLIGRAM(S): 2 INJECTION INTRAMUSCULAR; INTRAVENOUS; SUBCUTANEOUS at 14:52

## 2021-12-02 RX ADMIN — DIVALPROEX SODIUM 1000 MILLIGRAM(S): 500 TABLET, DELAYED RELEASE ORAL at 20:06

## 2021-12-02 RX ADMIN — Medication 3 MILLILITER(S): at 14:28

## 2021-12-02 RX ADMIN — Medication 500 MICROGRAM(S): at 06:40

## 2021-12-02 RX ADMIN — TRAMADOL HYDROCHLORIDE 50 MILLIGRAM(S): 50 TABLET ORAL at 09:41

## 2021-12-02 RX ADMIN — Medication 400 MILLIGRAM(S): at 11:38

## 2021-12-02 RX ADMIN — Medication 1000 MILLIGRAM(S): at 06:05

## 2021-12-02 RX ADMIN — HYDROMORPHONE HYDROCHLORIDE 0.25 MILLIGRAM(S): 2 INJECTION INTRAMUSCULAR; INTRAVENOUS; SUBCUTANEOUS at 14:37

## 2021-12-02 RX ADMIN — Medication 1000 MILLIGRAM(S): at 11:53

## 2021-12-02 RX ADMIN — LIDOCAINE 1 PATCH: 4 CREAM TOPICAL at 08:36

## 2021-12-02 RX ADMIN — LIDOCAINE 1 PATCH: 4 CREAM TOPICAL at 07:07

## 2021-12-02 RX ADMIN — Medication 1 PATCH: at 07:07

## 2021-12-02 RX ADMIN — Medication 500 MICROGRAM(S): at 00:23

## 2021-12-02 RX ADMIN — CHLORHEXIDINE GLUCONATE 1 APPLICATION(S): 213 SOLUTION TOPICAL at 13:35

## 2021-12-02 RX ADMIN — Medication 1000 MILLIGRAM(S): at 17:56

## 2021-12-02 RX ADMIN — Medication 3 MILLILITER(S): at 23:38

## 2021-12-02 RX ADMIN — Medication 3 MILLILITER(S): at 04:28

## 2021-12-02 RX ADMIN — SODIUM CHLORIDE 75 MILLILITER(S): 9 INJECTION, SOLUTION INTRAVENOUS at 20:06

## 2021-12-02 RX ADMIN — Medication 1 PATCH: at 21:28

## 2021-12-02 RX ADMIN — Medication 0.5 MILLIGRAM(S): at 06:40

## 2021-12-02 RX ADMIN — Medication 3 MILLILITER(S): at 06:40

## 2021-12-02 RX ADMIN — AZITHROMYCIN 500 MILLIGRAM(S): 500 TABLET, FILM COATED ORAL at 17:58

## 2021-12-02 RX ADMIN — Medication 20 MILLIGRAM(S): at 16:45

## 2021-12-02 RX ADMIN — CEFTRIAXONE 100 MILLIGRAM(S): 500 INJECTION, POWDER, FOR SOLUTION INTRAMUSCULAR; INTRAVENOUS at 16:30

## 2021-12-02 RX ADMIN — Medication 400 MILLIGRAM(S): at 17:41

## 2021-12-02 RX ADMIN — TRAMADOL HYDROCHLORIDE 50 MILLIGRAM(S): 50 TABLET ORAL at 09:11

## 2021-12-02 RX ADMIN — ENOXAPARIN SODIUM 40 MILLIGRAM(S): 100 INJECTION SUBCUTANEOUS at 13:35

## 2021-12-02 RX ADMIN — OLANZAPINE 15 MILLIGRAM(S): 15 TABLET, FILM COATED ORAL at 22:30

## 2021-12-02 RX ADMIN — Medication 3 MILLILITER(S): at 10:34

## 2021-12-02 RX ADMIN — TRAMADOL HYDROCHLORIDE 50 MILLIGRAM(S): 50 TABLET ORAL at 03:45

## 2021-12-02 NOTE — CONSULT NOTE ADULT - SUBJECTIVE AND OBJECTIVE BOX
TRAUMA COMANAGEMENT MEDICINE ATTENDING INITIAL CONSULT NOTE    HPI:  Patient is a 71y Male with PMH of Schizophrenia, HTN, current smoker (1/2pk a day), COPD who was initially admitted to [facility] after a fall on the sun deck at [facility] with cc of hip pain. Patient found to have Right comminuted Intertrochanteric fracture and was suppose to go for ORIF on 12/2. CXR showed mild right apical pneumothorax, persistent opacities. While on the floor, patient dessated, RRT was called, General surgery was called, patient then had serial CT done which revealed 4 right rib fx, with hemopneumothorax and pneumomediastinum and grade 3 liver laceration and no pulmonary embolism. Patient then transferred to [facility] trauma, for possible IR intervention for grade 3 liver laceration.   Patient transferred  to SICU, hypoxic on HFNC, c/o right hip pain.       SUBJECTIVE: Seen at bedside. On HFNC.      Home Medications:  amLODIPine 10 mg oral tablet: 1 tab(s) orally once a day (01 Dec 2021 22:49)  cloNIDine 0.3 mg oral tablet: 0.3 milligram(s) orally 3 times a day (01 Dec 2021 22:51)  Colace: 200 milligram(s) orally once a day (at bedtime) (01 Dec 2021 22:52)  Depakote ER: 1000 milligram(s) orally once a day (01 Dec 2021 22:49)  Dyazide 25 mg-37.5 mg oral capsule: 1 cap(s) orally once a day (01 Dec 2021 22:51)  enalapril: 40 milligram(s) orally once a day (01 Dec 2021 22:50)  OLANZapine 15 mg oral tablet: 1 tab(s) orally once a day (01 Dec 2021 22:50)      PAST MEDICAL & SURGICAL HISTORY:      Review of Systems:   CONSTITUTIONAL: No fever, weight loss, or fatigue  EYES: No eye pain, visual disturbances, or discharge  ENMT:  No difficulty hearing, tinnitus, vertigo; No sinus or throat pain  NECK: No pain or stiffness  RESPIRATORY: + cough,  CARDIOVASCULAR: No chest pain, palpitations, dizziness, or leg swelling  GASTROINTESTINAL: No abdominal or epigastric pain. No nausea, vomiting, or hematemesis; No diarrhea or constipation. No melena or hematochezia.  GENITOURINARY: No dysuria, frequency, hematuria, or incontinence  NEUROLOGICAL: No headaches, memory loss, loss of strength, numbness, or tremors  SKIN: No itching, burning, rashes, or lesions   ENDOCRINE: No heat or cold intolerance; No hair loss  MUSCULOSKELETAL: +HIP pain  PSYCHIATRIC: No depression, anxiety, mood swings, or difficulty sleeping      Allergies    No Known Allergies    Intolerances        Social History: Current smoker    FAMILY HISTORY:   No    Vital Signs Last 24 Hrs  T(C): 36.6 (02 Dec 2021 11:00), Max: 37.1 (01 Dec 2021 19:00)  T(F): 97.9 (02 Dec 2021 11:00), Max: 98.7 (01 Dec 2021 19:00)  HR: 102 (02 Dec 2021 14:00) (81 - 126)  BP: 140/74 (02 Dec 2021 14:00) (104/65 - 158/84)  BP(mean): 99 (02 Dec 2021 14:00) (76 - 113)  RR: 23 (02 Dec 2021 14:00) (19 - 43)  SpO2: 95% (02 Dec 2021 14:00) (79% - 97%)  CAPILLARY BLOOD GLUCOSE          PHYSICAL EXAM:  GENERAL: NAD, well-developed  EYES: EOMI  NECK: C-collar  CHEST/LUNG: Clear to auscultation bilaterally; No wheeze  HEART: Reg rate. No M/R/G.  ABDOMEN: Soft, NT/ND, Bowel sounds present  EXTREMITIES:  2+ Peripheral Pulses, No clubbing, cyanosis, or edema  PSYCH: AAOx3  NEUROLOGY: non-focal  SKIN: No rashes or lesions    LABS:                        8.9    19.36 )-----------( 336      ( 02 Dec 2021 11:46 )             26.8     12-01    129<L>  |  98  |  30<H>  ----------------------------<  117<H>  4.5   |  20<L>  |  0.96    Ca    8.4      01 Dec 2021 23:44  Phos  3.9     12-01  Mg     1.8     12-01    TPro  5.7<L>  /  Alb  3.1<L>  /  TBili  0.4  /  DBili  0.2  /  AST  26  /  ALT  22  /  AlkPhos  59  12-01    PT/INR - ( 01 Dec 2021 23:44 )   PT: 12.3 sec;   INR: 1.03 ratio         PTT - ( 01 Dec 2021 23:44 )  PTT:28.9 sec  CARDIAC MARKERS ( 01 Dec 2021 23:44 )  x     / x     / 415 U/L / x     / x          Urinalysis Basic - ( 01 Dec 2021 23:45 )    Color: Yellow / Appearance: Clear / SG: >1.050 / pH: x  Gluc: x / Ketone: Negative  / Bili: Negative / Urobili: 2 mg/dL   Blood: x / Protein: Trace / Nitrite: Negative   Leuk Esterase: Negative / RBC: 1 /hpf / WBC 1 /HPF   Sq Epi: x / Non Sq Epi: 1 /hpf / Bacteria: Negative          MEDICATIONS  (STANDING):  acetaminophen   IVPB .. 1000 milliGRAM(s) IV Intermittent every 6 hours  albuterol/ipratropium for Nebulization 3 milliLiter(s) Nebulizer every 4 hours  buDESOnide    Inhalation Suspension 0.5 milliGRAM(s) Inhalation every 12 hours  chlorhexidine 2% Cloths 1 Application(s) Topical daily  diVALproex ER 1000 milliGRAM(s) Oral every 24 hours  enoxaparin Injectable 40 milliGRAM(s) SubCutaneous every 24 hours  ipratropium    for Nebulization 500 MICROGram(s) Nebulizer every 6 hours  lidocaine   4% Patch 1 Patch Transdermal daily  nicotine -  14 mG/24Hr(s) Patch 1 patch Transdermal daily  OLANZapine 15 milliGRAM(s) Oral at bedtime  polyethylene glycol 3350 17 Gram(s) Oral at bedtime  senna 1 Tablet(s) Oral at bedtime    MEDICATIONS  (PRN):  HYDROmorphone  Injectable 0.25 milliGRAM(s) IV Push every 4 hours PRN breakthrough pain  traMADol 25 milliGRAM(s) Oral every 6 hours PRN Moderate Pain (4 - 6)  traMADol 50 milliGRAM(s) Oral every 6 hours PRN Severe Pain (7 - 10)    Imaging:   EXAM:  CT ABDOMEN AND PELVIS IC                          EXAM:  CT CHEST IC                            PROCEDURE DATE:  12/02/2021            INTERPRETATION:  CLINICAL INFORMATION: Trauma transfer, fall, grade 3 liver laceration, pneumothorax, right femur fracture.    COMPARISON: None.    CONTRAST/COMPLICATIONS:  IV Contrast: Omnipaque 350 (accession [id number]), IV contrast documented in associated exam (accession [id number])  90 cc administered   10 cc discarded  Oral Contrast: NONE  Complications: None reported at time of study completion    PROCEDURE:  CT of the Chest, Abdomen and Pelvis was performed.  Imaging was performed through the chest in the arterial phase followed by imaging of the abdomen and pelvis in the portal venous phase.  Sagittal and coronal reformats were performed.    FINDINGS:  CHEST:  LUNGS AND LARGE AIRWAYS: Partial atelectasis of the right lower lobe. Near complete atelectasis of the left lower lobe.  PLEURA: Small right hemopneumothorax. Trace left pleural fluid.  VESSELS: Atherosclerotic changes of the aorta and coronary arteries.  HEART: Heart size is normal. No pericardial effusion.  MEDIASTINUM AND GUS: No lymphadenopathy.  CHEST WALL AND LOWER NECK: Within normal limits.    ABDOMEN AND PELVIS:  LIVER: Segment 6/7 liver laceration measuring 3.6 cm in depth (grade III).  BILE DUCTS: Normal caliber.  GALLBLADDER: Cholelithiasis.  SPLEEN: Within normal limits.  PANCREAS: Within normal limits.  ADRENALS: Mild bilateral adrenal gland thickening, nonspecific.  KIDNEYS/URETERS: Excreted contrast within the collecting system. No hydronephrosis. Bilateral cysts and hypoattenuating foci which are too small to characterize. Question left renal pelvis urothelial thickening.    BLADDER: Excreted contrast in the bladder.  REPRODUCTIVE ORGANS: Prostate is enlarged.    BOWEL: No bowel obstruction. Appendix within normal limits.  PERITONEUM: No ascites.  VESSELS: Atherosclerotic changes.  RETROPERITONEUM/LYMPH NODES: Few enlarged retroperitoneal lymph nodes. For example, a 2.0 x 1.7 cm at the left renal vein (3, 60).  ABDOMINAL WALL: Incompletely imaged large right inguinal hernia containing nonobstructed small bowel and mesentery.  BONES: Acute displaced posterior right ninth through 10th and nondisplaced right posterior 8th rib fractures. Age indeterminant posterior right 11th, 12th left anterior 3rd through 5th rib fractures. Acute comminuted and impacted right femoral intertrochanteric fracture with lesser trochanter avulsion. Surrounding right hip soft tissue hematoma.    IMPRESSION:  Acute right posterior 8-10th rib fractures. Small right hemopneumothorax. Additional age indeterminant bilateral rib fractures.    Acute right femoral intertrochanteric fracture.    Grade III posterior liver laceration.    Question left sided urothelial thickening.    Retroperitoneal lymphadenopathy of uncertain etiology.

## 2021-12-02 NOTE — OCCUPATIONAL THERAPY INITIAL EVALUATION ADULT - LEVEL OF INDEPENDENCE: SIT/STAND, REHAB EVAL
unable to stand fully upright at this time with RW or non mechanical sit to stand/maximum assist (25% patients effort)

## 2021-12-02 NOTE — OCCUPATIONAL THERAPY INITIAL EVALUATION ADULT - ADDITIONAL COMMENTS
CT abdomen/pelvis 12/2-4cm hypodensitiy in segment 5/8 extending to the capsule, c/w grade III liver laceration. no subcapsular hematoma. no hemoperitoneum. left urothelial thickening in the renal pelvis.  right inguinal hernia containing fat and non-obstructed bowel. acute comminuted right intertrochanteric fracture. CT abdomen/pelvis 12/2-4cm hypodensitiy in segment 5/8 extending to the capsule, c/w grade III liver laceration. no subcapsular hematoma. no hemoperitoneum. left urothelial thickening in the renal pelvis.  right inguinal hernia containing fat and non-obstructed bowel. acute comminuted right intertrochanteric fracture.  CT Spine: Linear lucency extending from the C3-C4 left facet joint through the left C3 lamina raising the possibility of a nondisplaced fracture. Repeat cervical spine CT is advised for further evaluation.

## 2021-12-02 NOTE — ADVANCED PRACTICE NURSE CONSULT - RECOMMEDATIONS
Will recommend the followin. B/l buttocks: continue to monitor. apply Advanced Cavilon M-W-F-. Routine pericare between applications  2. right trochanter: apply Cavilon daily, continue to monitor  3. Continue with turning and positioning  4. Z-darrel cushion for positioning  5. complete Cair boots  6. Seat cushion when OOB to chair  7. nutrition consult  Tx plan discussed with RN

## 2021-12-02 NOTE — PHYSICAL THERAPY INITIAL EVALUATION ADULT - GENERAL OBSERVATIONS, REHAB EVAL
Patient received semi reclined in bed in SICU, NAD, VSS, +PIV capped, +5L O2 via NC, +ICU monitors, +vargas, +SCDS Patient received semi reclined in bed in SICU, NAD, VSS, +C-collar, +PIV capped, +5L O2 via NC, +ICU monitors, +vargas, +SCDS

## 2021-12-02 NOTE — PROGRESS NOTE ADULT - SUBJECTIVE AND OBJECTIVE BOX
No acute events overnight. Pain controlled. Endorses mild dyspnea. Denies fevers/chills or chest pain.    ICU Vital Signs Last 24 Hrs  T(C): 36.5 (01 Dec 2021 23:00), Max: 37.1 (01 Dec 2021 19:00)  T(F): 97.7 (01 Dec 2021 23:00), Max: 98.7 (01 Dec 2021 19:00)  HR: 94 (01 Dec 2021 23:00) (81 - 126)  BP: 104/65 (01 Dec 2021 23:00) (104/65 - 158/84)  BP(mean): 79 (01 Dec 2021 23:00) (77 - 113)  RR: 26 (01 Dec 2021 23:00) (20 - 43)  SpO2: 90% (01 Dec 2021 23:00) (79% - 96%)    PE  Gen: Laying in bed, alert and oriented, NAD  Resp: Unlabored breathing  RLE:  SILT DP/SP/ Adolfo/Saph/Post Tib  +EHL/FHL/TA/Gastroc,   DP+   Soft compartments, no calf ttp                            9.2    19.82 )-----------( 336      ( 01 Dec 2021 23:43 )             27.8   12-01    129<L>  |  98  |  30<H>  ----------------------------<  117<H>  4.5   |  20<L>  |  0.96    Ca    8.4      01 Dec 2021 23:44  Phos  3.9     12-01  Mg     1.8     12-01    TPro  5.7<L>  /  Alb  3.1<L>  /  TBili  0.4  /  DBili  0.2  /  AST  26  /  ALT  22  /  AlkPhos  59  12-01    PT/INR - ( 01 Dec 2021 23:44 )   PT: 12.3 sec;   INR: 1.03 ratio      PTT - ( 01 Dec 2021 23:44 )  PTT:28.9 sec      71yMale with R IT fx  - NWB RLE  - Pain control  - IS  - Continue home medications  - Regular Diet, NPOpMN/IVF  - CBC/BMP/Coags/UA/T+S x2  - EKG/CXR  - Medical clearance prior to planned procedure  - Plan for OR 12/2 for R femoral IMN

## 2021-12-02 NOTE — PROGRESS NOTE ADULT - SUBJECTIVE AND OBJECTIVE BOX
NOTE IN PROGRESS NEURO  Exam: awake and alert  acetaminophen   IVPB .. 1000 milliGRAM(s) IV Intermittent every 6 hours  diVALproex ER 1000 milliGRAM(s) Oral every 24 hours  OLANZapine 15 milliGRAM(s) Oral at bedtime  traMADol 25 milliGRAM(s) Oral every 6 hours PRN Moderate Pain (4 - 6)  traMADol 50 milliGRAM(s) Oral every 6 hours PRN Severe Pain (7 - 10)    RESPIRATORY  Exam:  b/l air entry  albuterol/ipratropium for Nebulization 3 milliLiter(s) Nebulizer every 6 hours  buDESOnide    Inhalation Suspension 0.5 milliGRAM(s) Inhalation every 12 hours  ipratropium    for Nebulization 500 MICROGram(s) Nebulizer every 6 hours    CARDIOVASCULAR  Exam: s1s2, rrr  Cardiac Rhythm: sinus    GI/NUTRITION  Exam: NT  Diet: NPO for OR 12/2    GENITOURINARY/RENAL  lactated ringers. 1000 milliLiter(s) IV Continuous <Continuous>  [ ] Ybarra catheter, indication: urine output monitoring in critically ill patient    HEMATOLOGIC  [ ] VTE Prophylaxis: holding for OR 12/2  Transfusion: [ ] PRBC	[ ] Platelets	[ ] FFP	[ ] Cryoprecipitate    INFECTIOUS DISEASES: afebrile    ENDOCRINE  CAPILLARY BLOOD GLUCOSE    PATIENT CARE ACCESS DEVICES:  [x ] Peripheral IV  [ ] Central Venous Line	[ ] R	[ ] L	[ ] IJ	[ ] Fem	[ ] SC	Placed:   [ ] Arterial Line		[ ] R	[ ] L	[ ] Fem	[ ] Rad	[ ] Ax	Placed:   [ ] PICC:					[ ] Mediport  [ ] Urinary Catheter, Date Placed:   [x] Necessity of urinary, arterial, and venous catheters discussed    OTHER MEDICATIONS: lidocaine   4% Patch 1 Patch Transdermal daily  nicotine -  14 mG/24Hr(s) Patch 1 patch Transdermal daily 71M with PMH of Schizophrenia, HTN, current smoker (1/2pk a day), new dx of COPD who was initially admitted to [facility] after a fall on the sun deck at [facility] with cc of hip pain. Patient found to have Right comminuted Intertrochanteric fracture and was suppose to go for ORIF on 12/2. CXR showed mild right apical pneumothorax, persistent opacities. While on the floor, patient dessated, RRT was called, General surgery was called, patient then had serial CT done which revealed 4 right rib fx, with hemopneumothorax and pneumomediastinum and grade 3 liver laceration and no pulmonary embolism. Patient then transferred to [facility] trauma, for possible IR intervention for grade 3 liver laceration.   Patient admitted to SICU, hypoxic on HFNC, c/o right hip pain.     NEURO  Exam: awake and alert  acetaminophen   IVPB .. 1000 milliGRAM(s) IV Intermittent every 6 hours  diVALproex ER 1000 milliGRAM(s) Oral every 24 hours  OLANZapine 15 milliGRAM(s) Oral at bedtime  traMADol 25 milliGRAM(s) Oral every 6 hours PRN Moderate Pain (4 - 6)  traMADol 50 milliGRAM(s) Oral every 6 hours PRN Severe Pain (7 - 10)    RESPIRATORY  Exam:  b/l air entry  albuterol/ipratropium for Nebulization 3 milliLiter(s) Nebulizer every 6 hours  buDESOnide    Inhalation Suspension 0.5 milliGRAM(s) Inhalation every 12 hours  ipratropium    for Nebulization 500 MICROGram(s) Nebulizer every 6 hours    CARDIOVASCULAR  Exam: s1s2, rrr  Cardiac Rhythm: sinus    GI/NUTRITION  Exam: NT  Diet: NPO for OR 12/2    GENITOURINARY/RENAL  lactated ringers. 1000 milliLiter(s) IV Continuous <Continuous>  [ ] Ybarra catheter, indication: urine output monitoring in critically ill patient    HEMATOLOGIC  [ ] VTE Prophylaxis: holding for OR 12/2  Transfusion: [ ] PRBC	[ ] Platelets	[ ] FFP	[ ] Cryoprecipitate    INFECTIOUS DISEASES: afebrile    ENDOCRINE  CAPILLARY BLOOD GLUCOSE    PATIENT CARE ACCESS DEVICES:  [x ] Peripheral IV  [ ] Central Venous Line	[ ] R	[ ] L	[ ] IJ	[ ] Fem	[ ] SC	Placed:   [ ] Arterial Line		[ ] R	[ ] L	[ ] Fem	[ ] Rad	[ ] Ax	Placed:   [ ] PICC:					[ ] Mediport  [ ] Urinary Catheter, Date Placed:   [x] Necessity of urinary, arterial, and venous catheters discussed    OTHER MEDICATIONS: lidocaine   4% Patch 1 Patch Transdermal daily  nicotine -  14 mG/24Hr(s) Patch 1 patch Transdermal daily

## 2021-12-02 NOTE — PHYSICAL THERAPY INITIAL EVALUATION ADULT - IMPAIRMENTS FOUND, PT EVAL
Event Note aerobic capacity/endurance/arousal, attention, and cognition/gait, locomotion, and balance

## 2021-12-02 NOTE — CONSULT NOTE ADULT - ASSESSMENT
71y Male with PMH of Schizophrenia, HTN, current smoker (1/2pk a day), COPD who presented with Right comminuted Intertrochanteric fracture with 4 right rib fx, with hemopneumothorax and pneumomediastinum and grade 3 liver laceration with course c/b hypoxic respiratory failure

## 2021-12-02 NOTE — OCCUPATIONAL THERAPY INITIAL EVALUATION ADULT - PRECAUTIONS/LIMITATIONS, REHAB EVAL
CXR showed mild right apical pneumothorax, persistent opacities. While on the floor, patient dessated, RRT was called, General surgery was called, patient then had serial CT done which revealed 4 right rib fx, with hemopneumothorax and pneumomediastinum and grade 3 liver laceration and no pulmonary embolism. Patient then transferred to [facility] trauma, for possible IR intervention for grade 3 liver laceration.Patient transferred  to SICU, hypoxic on HFNC, c/o right hip pain./fall precautions CXR showed mild right apical pneumothorax, persistent opacities. While on the floor, patient dessated, RRT was called, General surgery was called, patient then had serial CT done which revealed 4 right rib fx, with hemopneumothorax and pneumomediastinum and grade 3 liver laceration and no pulmonary embolism. Patient then transferred to [facility] trauma, for possible IR intervention for grade 3 liver laceration.Patient transferred  to SICU, hypoxic on HFNC, c/o right hip pain. Pt now s/p R IMN 12/4/fall precautions

## 2021-12-02 NOTE — CONSULT NOTE ADULT - SUBJECTIVE AND OBJECTIVE BOX
p ([id number])     HPI: 71M PMH schizophrenia, HTN, COPD (current smoker), s/p fall w/ polytrauma. Has Right comminuted intertrochanteric fx. CT C-spine shows nondisplaced C3 Left facet fx. Patient denies neck pain, paresthesias, myelopathic symptoms of bowel/bladder dysfunction, difficulty with hand fine motor movements. Exam: AOx3, PERRL, EOMI, no facial, no drift, BUE tremulous but 5/5 (on antipsychotics), no Singh's, LHF 5/5, LKE 4-/5 (pain-brand), LKF 4+/5, Right leg immobile from injury, B/L DF/PF 5/5, SILT      --Anticoagulation:  enoxaparin Injectable 40 milliGRAM(s) SubCutaneous every 24 hours    =====================  PAST MEDICAL HISTORY     PAST SURGICAL HISTORY         MEDICATIONS:  Antibiotics:    Neuro:  acetaminophen   IVPB .. 1000 milliGRAM(s) IV Intermittent every 6 hours  diVALproex ER 1000 milliGRAM(s) Oral every 24 hours  HYDROmorphone  Injectable 0.25 milliGRAM(s) IV Push every 4 hours PRN  OLANZapine 15 milliGRAM(s) Oral at bedtime  traMADol 25 milliGRAM(s) Oral every 6 hours PRN  traMADol 50 milliGRAM(s) Oral every 6 hours PRN    Other:  albuterol/ipratropium for Nebulization 3 milliLiter(s) Nebulizer every 4 hours  buDESOnide    Inhalation Suspension 0.5 milliGRAM(s) Inhalation every 12 hours  ipratropium    for Nebulization 500 MICROGram(s) Nebulizer every 6 hours  polyethylene glycol 3350 17 Gram(s) Oral at bedtime  senna 1 Tablet(s) Oral at bedtime      SOCIAL HISTORY:   Occupation:   Marital Status:     FAMILY HISTORY:      ROS: Negative except per HPI    LABS:  PT/INR - ( 01 Dec 2021 23:44 )   PT: 12.3 sec;   INR: 1.03 ratio         PTT - ( 01 Dec 2021 23:44 )  PTT:28.9 sec                        9.1    19.77 )-----------( 327      ( 02 Dec 2021 03:49 )             27.0     12-01    129<L>  |  98  |  30<H>  ----------------------------<  117<H>  4.5   |  20<L>  |  0.96    Ca    8.4      01 Dec 2021 23:44  Phos  3.9     12-01  Mg     1.8     12-01    TPro  5.7<L>  /  Alb  3.1<L>  /  TBili  0.4  /  DBili  0.2  /  AST  26  /  ALT  22  /  AlkPhos  59  12-01

## 2021-12-02 NOTE — ADVANCED PRACTICE NURSE CONSULT - REASON FOR CONSULT
Requested by staff to assess skin status of pt a/w a pressure injury. PMH is noted:  Patient is a 71y Male with PMH of Schizophrenia, HTN, current smoker (1/2pk a day), new dx of COPD who was initially admitted to [facility] after a fall on the sun deck at [facility] with cc of hip pain. Patient found to have Right comminuted Intertrochanteric fracture and was suppose to go for ORIF on 12/2. CXR showed mild right apical pneumothorax, persistent opacities. While on the floor, patient dessated, RRT was called, General surgery was called, patient then had serial CT done which revealed 4 right rib fx, with hemopneumothorax and pneumomediastinum and grade 3 liver laceration and no pulmonary embolism. Patient then transferred to [facility] trauma, for possible IR intervention for grade 3 liver laceration.   Patient transferred  to SICU, hypoxic on HFNC, c/o right hip pain.

## 2021-12-02 NOTE — PROGRESS NOTE ADULT - ASSESSMENT
Patient is a 71y Male with PMH of Schizophrenia, HTN, current smoker (1/2pk a day), new dx of COPD who was initially admitted to [facility] after a fall on the sun deck at [facility] with cc of hip pain. Patient found to have Right comminuted Intertrochanteric fracture and was suppose to go for ORIF on 12/2. CXR showed mild right apical pneumothorax, persistent opacities. While on the floor, patient dessated, RRT was called, General surgery was called, patient then had serial CT done which revealed 4 right rib fx, with hemopneumothorax and pneumomediastinum and grade 3 liver laceration and no pulmonary embolism. Patient then transferred to [facility] trauma, for possible IR intervention for grade 3 liver laceration.       - serial cbc for liver laceration  - continue HFNC for hypoxia  - pain control for rib fx  - OR today with ortho  - NPO/IVF  - Encourage IS

## 2021-12-02 NOTE — ADVANCED PRACTICE NURSE CONSULT - ASSESSMENT
AS per discussion with the primary RN, the pt is waiting for surgical repair of his right hip. Mr Fuentes was encountered in the 8ICU- he is on a Progressa support surface and is being assisted with turning and positioning. Will recommend Complete CAir boots for off-loading the heels.  He is awake and alert, able to state his name and , reporting pain in his right  hip and was medicated for pain by the primary RN  for same.   The pt has a hard cervical collar in place. As he was a/w a pressure injury, will request a nutrition consult for further evaluation.  Upon assessment, the pt was incontinent of a small amount of stool and pericare was provided. On the b/l buttocks and sacrum the following was noted: deep red discoloration of the skin with a small open area- given hx of fall and presentation of the skin today, will classify as a deep tissue injury, present on admission that is evolving. Will recommend the application of Advanced Cavilon to lay down a protective coating on the skin.  On the right trochanter were skin changes as follows:  a raised healed hypopigmentd area of intact skin measuring 2cmx 3cmx 0cm  a small bruise just proximal to the healed hypopigmented area.   Will recommend to continue to monitor these areas- previously wounded areas are more susceptible to injury AS per discussion with the primary RN, the pt is waiting for surgical repair of his right hip. Mr Fuentes was encountered in the 8ICU- he is on a Progressa support surface and is being assisted with turning and positioning. Will recommend Complete CAir boots for off-loading the heels.  He is awake and alert, able to state his name and , reporting pain in his right  hip and was medicated for pain by the primary RN  for same.   The pt has a hard cervical collar in place. As he was a/w a pressure injury, will request a nutrition consult for further evaluation.  Upon assessment, the pt was incontinent of a small amount of stool and pericare was provided. On the b/l buttocks and sacrum the following was noted: deep red discoloration of the skin with a small open area- given hx of fall and presentation of the skin today, will classify as a deep tissue injury, present on admission that is evolving.  The area in question measures 7cmx 12cm x0cm with 80% deep red discoloration of intact skin and 20% open pink moist tissue. Will recommend the application of Advanced Cavilon to lay down a protective coating on the skin.  On the right trochanter were skin changes as follows:  a raised healed hypopigmentd area of intact skin measuring 2cmx 3cmx 0cm  a small bruise just proximal to the healed hypopigmented area.   Will recommend to continue to monitor these areas- previously wounded areas are more susceptible to injury

## 2021-12-02 NOTE — PROGRESS NOTE ADULT - SUBJECTIVE AND OBJECTIVE BOX
GENERAL SURGERY DAILY PROGRESS NOTE:       Subjective:  Pain controlled. Denies N/V. Tolerating diet. Passing flatus and BM. No dizziness, N/V, CP or SOB        Objective:    PE:  Gen: NAD  Resp: airway patent, respirations unlabored, no increased WoB  CVS: RRR  Abd: soft, ND, NT, no rebound or guarding  Ext: no edema, WWP  Neuro: AAOx3, no focal deficits    Vital Signs Last 24 Hrs  T(C): 36.6 (02 Dec 2021 11:00), Max: 37.1 (01 Dec 2021 19:00)  T(F): 97.9 (02 Dec 2021 11:00), Max: 98.7 (01 Dec 2021 19:00)  HR: 102 (02 Dec 2021 15:00) (81 - 126)  BP: 129/70 (02 Dec 2021 15:00) (104/65 - 158/84)  BP(mean): 92 (02 Dec 2021 15:00) (76 - 113)  RR: 21 (02 Dec 2021 15:00) (19 - 43)  SpO2: 88% (02 Dec 2021 15:00) (79% - 97%)    I&O's Detail    01 Dec 2021 07:01  -  02 Dec 2021 07:00  --------------------------------------------------------  IN:    IV PiggyBack: 200 mL    Lactated Ringers: 200 mL    sodium chloride 0.9%: 1100 mL    Sodium Chloride 0.9% Bolus: 1000 mL  Total IN: 2500 mL    OUT:    Incontinent per Condom Catheter (mL): 150 mL    Indwelling Catheter - Urethral (mL): 495 mL  Total OUT: 645 mL    Total NET: 1855 mL      02 Dec 2021 07:01  -  02 Dec 2021 15:06  --------------------------------------------------------  IN:    Oral Fluid: 240 mL    sodium chloride 0.9%: 200 mL  Total IN: 440 mL    OUT:    Indwelling Catheter - Urethral (mL): 445 mL  Total OUT: 445 mL    Total NET: -5 mL          Daily Height in cm: 180.34 (01 Dec 2021 19:35)    Daily Weight in k.9 (02 Dec 2021 01:00)    MEDICATIONS  (STANDING):  acetaminophen   IVPB .. 1000 milliGRAM(s) IV Intermittent every 6 hours  albuterol/ipratropium for Nebulization 3 milliLiter(s) Nebulizer every 4 hours  buDESOnide    Inhalation Suspension 0.5 milliGRAM(s) Inhalation every 12 hours  chlorhexidine 2% Cloths 1 Application(s) Topical daily  diVALproex ER 1000 milliGRAM(s) Oral every 24 hours  enoxaparin Injectable 40 milliGRAM(s) SubCutaneous every 24 hours  ipratropium    for Nebulization 500 MICROGram(s) Nebulizer every 6 hours  lidocaine   4% Patch 1 Patch Transdermal daily  nicotine -  14 mG/24Hr(s) Patch 1 patch Transdermal daily  OLANZapine 15 milliGRAM(s) Oral at bedtime  polyethylene glycol 3350 17 Gram(s) Oral at bedtime  senna 1 Tablet(s) Oral at bedtime    MEDICATIONS  (PRN):  HYDROmorphone  Injectable 0.25 milliGRAM(s) IV Push every 4 hours PRN breakthrough pain  traMADol 25 milliGRAM(s) Oral every 6 hours PRN Moderate Pain (4 - 6)  traMADol 50 milliGRAM(s) Oral every 6 hours PRN Severe Pain (7 - 10)      LABS:                        8.9    19.36 )-----------( 336      ( 02 Dec 2021 11:46 )             26.8     12-    129<L>  |  98  |  30<H>  ----------------------------<  117<H>  4.5   |  20<L>  |  0.96    Ca    8.4      01 Dec 2021 23:44  Phos  3.9     12  Mg     1.8         TPro  5.7<L>  /  Alb  3.1<L>  /  TBili  0.4  /  DBili  0.2  /  AST  26  /  ALT  22  /  AlkPhos  59  12    PT/INR - ( 01 Dec 2021 23:44 )   PT: 12.3 sec;   INR: 1.03 ratio         PTT - ( 01 Dec 2021 23:44 )  PTT:28.9 sec  Urinalysis Basic - ( 01 Dec 2021 23:45 )    Color: Yellow / Appearance: Clear / SG: >1.050 / pH: x  Gluc: x / Ketone: Negative  / Bili: Negative / Urobili: 2 mg/dL   Blood: x / Protein: Trace / Nitrite: Negative   Leuk Esterase: Negative / RBC: 1 /hpf / WBC 1 /HPF   Sq Epi: x / Non Sq Epi: 1 /hpf / Bacteria: Negative        RADIOLOGY & ADDITIONAL STUDIES:

## 2021-12-02 NOTE — PROGRESS NOTE ADULT - ASSESSMENT
71M with PMH of Schizophrenia, HTN, current smoker (1/2pk a day), new dx of COPD who was initially admitted to [facility] after a fall on the sun deck at [facility] with cc of hip pain. Patient found to have Right comminuted Intertrochanteric fracture, and right rib fx, with hemopneumothorax and grade 3 liver laceration with hypoxia r/o PE. Patient admit to SICU for hemodynamic monitoring.    Neuro: hx of schizophrenia  -f/u CT head & c-spine (ord)  -home olanzaprine, Depakote  -acute pain control for rib fx: tylenol, tramadol and Dilaudid prn  -monitor mental status    Resp: right rib fx, hypoxic resp failure, had recent CTA no PE  -f/u CT c/a/p (ord)  -CXR daily  -IS  -Pulmicort and Duonebs  -HFNC titrate to O2 between 88-92  -chest PT  -OOB to chair  -HOB >35 degrees    CVS: hx of HTN, BPs borderline on admission, lactate cleared  -monitor hemodynamics  -holding home amlodipine and enalapril    GI:   -NPO for OR 12/2  -bowel regimen: senna, Miralax    /Renal: grade 3 liver lac, hyponatremia ? pre-renal  -1L NS bolus x1  -monitor renal function and electrolytes    Heme: grade 3 liver lac, H&H stable  -q4h CBC    ID: UA neg  -monitor temp and WBC    Endo:   -monitor glucose    MSK: R IT fx  -OR 12/2 with ortho for R IMN  -optimized for OR pending AM labs are stable; pt is at increased risk but benefits of early fixation/mobilization outweigh risks 71M with PMH of Schizophrenia, HTN, current smoker (1/2pk a day), new dx of COPD who was initially admitted to [facility] after a fall on the sun deck at [facility] with cc of hip pain. Patient found to have Right comminuted Intertrochanteric fracture, and right rib fx, with hemopneumothorax and grade 3 liver laceration with hypoxia r/o PE. Patient admit to SICU for hemodynamic monitoring.    Neuro: hx of schizophrenia  -f/u CT head & c-spine (ord)  -home olanzaprine, Depakote  -acute pain control for rib fx: tylenol, tramadol and Dilaudid prn  -monitor mental status    Resp: right rib fx, hypoxic resp failure, had recent CTA no PE  -f/u CT c/a/p (ord)  -CXR daily  -IS  -Pulmicort and Duonebs  -HFNC titrate to O2 between 88-92  -chest PT  -OOB to chair  -HOB >35 degrees    CVS: hx of HTN, BPs borderline on admission, lactate cleared  -monitor hemodynamics  -holding home amlodipine and enalapril    GI: grade 3 liver lac  -NPO for OR 12/2  -bowel regimen: senna, Miralax    /Renal: hyponatremia ? pre-renal  -1L NS bolus x1  -monitor renal function and electrolytes    Heme: H&H stable  -q4h CBC    ID: UA neg  -monitor temp and WBC    Endo:   -monitor glucose    MSK: R IT fx  -OR 12/2 with ortho for R IMN  -optimized for OR pending AM labs are stable; pt is at increased risk but benefits of early fixation/mobilization outweigh risks 71M with PMH of Schizophrenia, HTN, current smoker (1/2pk a day), new dx of COPD who was initially admitted to [facility] after a fall on the sun deck at [facility] with cc of hip pain. Patient found to have Right comminuted Intertrochanteric fracture, and right rib fx, with hemopneumothorax and grade 3 liver laceration with hypoxia r/o PE. Patient admit to SICU for hemodynamic monitoring.    Neuro: hx of schizophrenia  -f/u CT head & c-spine (official read)  -home olanzaprine, Depakote  -acute pain control for rib fx: tylenol, tramadol and Dilaudid prn  -monitor mental status    Resp: right rib fx, hypoxic resp failure, had recent CTA no PE  -f/u CT c/a/p (official read)  -CXR daily  -IS  -Pulmicort and Duonebs  -HFNC titrate to O2 between 88-92  -chest PT  -OOB to chair  -HOB >35 degrees    CVS: hx of HTN, BPs borderline on admission, lactate cleared  -monitor hemodynamics  -holding home amlodipine and enalapril    GI: grade 3 liver lac  -NPO for OR 12/2  -bowel regimen: senna, Miralax    /Renal: hyponatremia ? pre-renal  -1L NS bolus x1  -monitor renal function and electrolytes    Heme: H&H stable  -q4h CBC    ID: UA neg  -monitor temp and WBC    Endo:   -monitor glucose    MSK: R IT fx  -OR 12/2 with ortho for R IMN  -optimized for OR pending AM labs are stable; pt is at increased risk but benefits of early fixation/mobilization outweigh risks

## 2021-12-02 NOTE — CHART NOTE - NSCHARTNOTEFT_GEN_A_CORE
TERTIARY TRAUMA SURVEY  ------------------------------------------------------------------------------------------  Date/Time: 12-02-21 @ 13:22  Admit date: 11/1  Admit GCS:  14	  ------------------------------------------------------------------------------------------    HPI:  Patient is a 71y Male with PMH of Schizophrenia, HTN, current smoker (1/2pk a day), new dx of COPD who was initially admitted to [facility] after a fall on the sun deck at [facility] with cc of hip pain. Patient found to have Right comminuted Intertrochanteric fracture and was suppose to go for ORIF on 12/2. CXR showed mild right apical pneumothorax, persistent opacities. While on the floor, patient dessated, RRT was called, General surgery was called, patient then had serial CT done which revealed 4 right rib fx, with hemopneumothorax and pneumomediastinum and grade 3 liver laceration and no pulmonary embolism. Patient then transferred to [facility] trauma, for possible IR intervention for grade 3 liver laceration. Patient transferred  to SICU, hypoxic on HFNC, c/o right hip pain.     Primary Survey:   Airway: intact, HFNC 100%/60L sating 88-95  Breathing: mildly tachypneic, b/l air entry, sats   88-95 on  HFNC 100%/60L  Circulation: mildly tachycardic between 100-105, 2 + pulses in all extremeties    Secondary  Survey:   HEENT: atraumatic, normocephalic, no bleeding, non tender over head face  Neck: trachea in midline, no cervical tenderness, no pain to flexion, extension, rotation and side bending  Pulmo: some tenderness to right ribs, no ecchymosis, b/l breath sounds  Cardiac: tachycardic 100-105, NSR  Abdomen: soft, non tender, not distended, no ecchymosis  MSK: Right Hip TTP with limited ROM, soft compartments, LLE full ROM, full strength (01 Dec 2021 22:45)    ALLERGIES: No Known Allergies    ------------------------------------------------------------------------------------------    VITAL SIGNS  T(C): 36.6 (12-02-21 @ 11:00), Max: 37.1 (12-01-21 @ 19:00)  HR: 93 (12-02-21 @ 11:00) (81 - 126)  BP: 135/71 (12-02-21 @ 11:00) (104/65 - 158/84)  RR: 23 (12-02-21 @ 11:00) (19 - 43)  SpO2: 90% (12-02-21 @ 11:00) (79% - 97%)    Drug Dosing Weight  Height (cm): 180.3 (01 Dec 2021 19:35)  Weight (kg): 88.6 (01 Dec 2021 19:35)  BMI (kg/m2): 27.3 (01 Dec 2021 19:35)  BSA (m2): 2.09 (01 Dec 2021 19:35)    ------------------------------------------------------------------------------------------    INS/OUTS:    12-01 @ 07:01  -  12-02 @ 07:00  --------------------------------------------------------  IN:    IV PiggyBack: 200 mL    Lactated Ringers: 200 mL    sodium chloride 0.9%: 1100 mL    Sodium Chloride 0.9% Bolus: 1000 mL  Total IN: 2500 mL    OUT:    Incontinent per Condom Catheter (mL): 150 mL    Indwelling Catheter - Urethral (mL): 495 mL  Total OUT: 645 mL    Total NET: 1855 mL      12-02 @ 07:01  -  12-02 @ 13:22  --------------------------------------------------------  IN:    sodium chloride 0.9%: 200 mL  Total IN: 200 mL    OUT:    Indwelling Catheter - Urethral (mL): 250 mL  Total OUT: 250 mL    Total NET: -50 mL    ------------------------------------------------------------------------------------------    PHYSICAL EXAM:   General: NAD, Sitting in bed comfortably  HEENT: NC/AT, EOMI  Neck: Soft, supple, nontender to palpation  Cardio: RRR, nml S1/S2  Resp: Good effort, CTA b/l  Thorax: No chest wall tenderness  Breast: No lesions/masses, no drainage  GI/Abd: Soft, NT/ND, no rebound/guarding, no masses palpated  Vascular: Extremities warm, brisk cap refill, B/l radial pulses palpable, b/l DP/PT palpable, no palpable abdominal pulsatile mass  Skin: Intact, no breakdown  Lymphatic/Nodes: No palpable lymphadenopathy  Musculoskeletal: All 4 extremities moving spontaneously, no limitations  ------------------------------------------------------------------------------------------    LABS  CBC (12-02 @ 11:46)                              8.9<L>                         19.36<H>  )----------------(  336        --    % Neutrophils, --    % Lymphocytes, ANC: --                                  26.8<L>  CBC (12-02 @ 03:49)                              9.1<L>                         19.77<H>  )----------------(  327        --    % Neutrophils, --    % Lymphocytes, ANC: --                                  27.0<L>    BMP (12-01 @ 23:44)             129<L>  |  98      |  30<H> 		Ca++ --      Ca 8.4                ---------------------------------( 117<H>		Mg 1.8                4.5     |  20<L>   |  0.96  			Ph 3.9     BMP (12-01 @ 19:19)             131<L>  |  94<L>   |  30<H> 		Ca++ --      Ca 9.2                ---------------------------------( 111<H>		Mg 1.9                4.6     |  21<L>   |  1.04  			Ph 4.3       LFTs (12-01 @ 23:44)      TPro 5.7<L> / Alb 3.1<L> / TBili 0.4 / DBili 0.2 / AST 26 / ALT 22 / AlkPhos 59    Coags (12-01 @ 23:44)  aPTT 28.9 / INR 1.03 / PT 12.3  Coags (12-01 @ 19:19)  aPTT 28.4 / INR 0.97 / PT 11.7    Cardiac Markers (12-01 @ 23:44)     HSTrop: -- / CKMB: -- / CK: 415    ABG (12-02 @ 11:39)     7.45 / 33<L> / 71<L> / 23 / -0.7 / 95.6%     Lactate:    ABG (12-02 @ 03:44)     7.43 / 32<L> / 64<L> / 21 / -2.3<L> / 93.2<L>%     Lactate:        MICROBIOLOGY  Urinalysis (12-01 @ 23:45):     Color: Yellow / Appearance: Clear / SG: >1.050<!> / pH: 6.0 / Gluc: Negative / Ketones: Negative / Bili: Negative / Urobili: 2 mg/dL<!> / Protein :Trace<!> / Nitrites: Negative / Leuk.Est: Negative / RBC: 1 / WBC: 1 / Sq Epi:  / Non Sq Epi: 1 / Bacteria Negative         ------------------------------------------------------------------------------------------    RADIOLOGICAL FINDINGS REVIEW:      Chest CT:   Linear lucency extending from the C3-C4 left facet joint through the left C3 lamina raising the possibility of a nondisplaced fracture. Repeat cervical spine CT is advised for further evaluation.    ABD/Pelvis CT:   Acute right posterior 8-10th rib fractures. Small right hemopneumothorax. Additional age indeterminant bilateral rib fractures. Acute right femoral intertrochanteric fracture. Grade III posterior liver laceration. Question left sided urothelial thickening. Retroperitoneal lymphadenopathy of uncertain etiology.    Pelvic XR  Redemonstrated proximal right femoral varus impacted intertrochanteric fracture with lesser trochanteric avulsion. In addition appearance of associated external rotatory displacement of the right femoral shaft/thigh distal to the fracture site. No dislocations or additional fractures. Preserved right hip joint space. Generalized osteopenia otherwise no discrete lytic or blastic lesions.    List Injuries Identified to Date:    - R posterior 8-10th rib fractures  - R femoral intertrochanteric fx w/lesser trochanteric avulsion  - Grade III posterior liver laceration      List Operative and Interventional Radiological Procedures:    12/3 - OR w/orthopedic surgery for R femoral IMN      Consults (Date):    [x] Neurosurgery   [x] Orthopedic Surgery  [x] PT/OT      INTERPRETATION:  Mr. Dill is a 71y M presenting as a transfer from OSH after fall, found to have R comminuted intertrochanteric fracture, grade III liver lac, R posterior 8-10 rib fractures.         TRAUMA  [phone]

## 2021-12-02 NOTE — PHYSICAL THERAPY INITIAL EVALUATION ADULT - PRECAUTIONS/LIMITATIONS, REHAB EVAL
CXR showed mild right apical pneumothorax, persistent opacities. While on the floor, patient dessated, RRT was called, General surgery was called, patient then had serial CT done which revealed 4 right rib fx, with hemopneumothorax and pneumomediastinum and grade 3 liver laceration and no pulmonary embolism. Patient then transferred to [facility] trauma, for possible IR intervention for grade 3 liver laceration. CXR showed mild right apical pneumothorax, persistent opacities. While on the floor, patient dessated, RRT was called, General surgery was called, patient then had serial CT done which revealed 4 right rib fx, with hemopneumothorax and pneumomediastinum and grade 3 liver laceration and no pulmonary embolism. Patient then transferred to [facility] trauma, for possible IR intervention for grade 3 liver laceration.  CT chest: Acute right posterior 8-10th rib fractures. Small right hemopneumothorax. XR R hip: Redemonstrated varus impacted proximal right femoral intertrochanteric fracture with lesser trochanteric avulsion. CXR showed mild right apical pneumothorax, persistent opacities. While on the floor, patient dessated, RRT was called, General surgery was called, patient then had serial CT done which revealed 4 right rib fx, with hemopneumothorax and pneumomediastinum and grade 3 liver laceration and no pulmonary embolism. Patient then transferred to [facility] trauma, for possible IR intervention for grade 3 liver laceration.  CT chest: Acute right posterior 8-10th rib fractures. Small right hemopneumothorax. XR R hip: Redemonstrated varus impacted proximal right femoral intertrochanteric fracture with lesser trochanteric avulsion./fall precautions/isolation precautions

## 2021-12-02 NOTE — CONSULT NOTE ADULT - PROBLEM SELECTOR RECOMMENDATION 9
Possibly 2/2 COPD exacerbation given cough with sputum production. Was on HFNC this am saturating low 90's  - Consider starting systemic steroids, Prednisone 40mg and Levaquin  - c/w standing duonebs. Would d/c standing atrovent as pt is already on duonebs.   - Was prescribed Advair 500mg/50mg. Would restart

## 2021-12-02 NOTE — CONSULT NOTE ADULT - ASSESSMENT
71M PMH schizophrenia, HTN, COPD (current smoker), s/p fall w/ polytrauma. Has Right comminuted intertrochanteric fx. CT C-spine shows nondisplaced C3 Left facet fx. Patient denies neck pain, paresthesias, myelopathic symptoms of bowel/bladder dysfunction, difficulty with hand fine motor movements. Exam: AOx3, PERRL, EOMI, no facial, no drift, BUE tremulous but 5/5 (on antipsychotics), no Singh's, LHF 5/5, LKE 4-/5 (pain-brand), LKF 4+/5, Right leg immobile from injury, B/L DF/PF 5/5, SILT  -No acute neurosurgical intervention  -C-collar at all times  -Outpt f/u with Dr. Johnson within 4-6 weeks

## 2021-12-02 NOTE — PHYSICAL THERAPY INITIAL EVALUATION ADULT - PERTINENT HX OF CURRENT PROBLEM, REHAB EVAL
Pt is a 72 y/o male admitted to [facility] on 12/1/21 PMH of Schizophrenia, HTN, current smoker (1/2pk a day), new dx of COPD who was initially admitted to [facility] after a fall on the sun deck at  with cc of hip pain. Patient found to have Right comminuted Intertrochanteric fracture.

## 2021-12-02 NOTE — CONSULT NOTE ADULT - PROBLEM SELECTOR RECOMMENDATION 3
Was prescribed Norvasc 10mg, Enalapril 20mg, Dyazide 25mg-37.5mg, Clonidine 0.3mg. BP currently acceptable  - Should BP increase would resume home anti-hypertensives

## 2021-12-02 NOTE — OCCUPATIONAL THERAPY INITIAL EVALUATION ADULT - PERTINENT HX OF CURRENT PROBLEM, REHAB EVAL
71y Male with PMH of Schizophrenia, HTN, current smoker (1/2pk a day), new dx of COPD who was initially admitted to [facility] after a fall on the sun deck at [facility] with cc of hip pain. Patient found to have Right comminuted Intertrochanteric fracture and was suppose to go for ORIF on 12/2.

## 2021-12-02 NOTE — PHYSICAL THERAPY INITIAL EVALUATION ADULT - ADDITIONAL COMMENTS
Patient resides in an adult home, independent with ADLs, patient did not state if there were stairs within home

## 2021-12-03 ENCOUNTER — TRANSCRIPTION ENCOUNTER (OUTPATIENT)
Age: 71
End: 2021-12-03

## 2021-12-03 ENCOUNTER — RESULT REVIEW (OUTPATIENT)
Age: 71
End: 2021-12-03

## 2021-12-03 DIAGNOSIS — Z29.9 ENCOUNTER FOR PROPHYLACTIC MEASURES, UNSPECIFIED: ICD-10-CM

## 2021-12-03 LAB
ANION GAP SERPL CALC-SCNC: 12 MMOL/L — SIGNIFICANT CHANGE UP (ref 5–17)
APTT BLD: 26.7 SEC — LOW (ref 27.5–35.5)
BUN SERPL-MCNC: 29 MG/DL — HIGH (ref 7–23)
CALCIUM SERPL-MCNC: 8.5 MG/DL — SIGNIFICANT CHANGE UP (ref 8.4–10.5)
CHLORIDE SERPL-SCNC: 99 MMOL/L — SIGNIFICANT CHANGE UP (ref 96–108)
CO2 SERPL-SCNC: 21 MMOL/L — LOW (ref 22–31)
CREAT SERPL-MCNC: 0.83 MG/DL — SIGNIFICANT CHANGE UP (ref 0.5–1.3)
GAS PNL BLDA: SIGNIFICANT CHANGE UP
GLUCOSE SERPL-MCNC: 145 MG/DL — HIGH (ref 70–99)
HCT VFR BLD CALC: 25.7 % — LOW (ref 39–50)
HGB BLD-MCNC: 8.7 G/DL — LOW (ref 13–17)
INR BLD: 0.97 RATIO — SIGNIFICANT CHANGE UP (ref 0.88–1.16)
MAGNESIUM SERPL-MCNC: 2 MG/DL — SIGNIFICANT CHANGE UP (ref 1.6–2.6)
MCHC RBC-ENTMCNC: 28 PG — SIGNIFICANT CHANGE UP (ref 27–34)
MCHC RBC-ENTMCNC: 33.9 GM/DL — SIGNIFICANT CHANGE UP (ref 32–36)
MCV RBC AUTO: 82.6 FL — SIGNIFICANT CHANGE UP (ref 80–100)
NRBC # BLD: 0 /100 WBCS — SIGNIFICANT CHANGE UP (ref 0–0)
PHOSPHATE SERPL-MCNC: 3.3 MG/DL — SIGNIFICANT CHANGE UP (ref 2.5–4.5)
PLATELET # BLD AUTO: 338 K/UL — SIGNIFICANT CHANGE UP (ref 150–400)
POTASSIUM SERPL-MCNC: 4.4 MMOL/L — SIGNIFICANT CHANGE UP (ref 3.5–5.3)
POTASSIUM SERPL-SCNC: 4.4 MMOL/L — SIGNIFICANT CHANGE UP (ref 3.5–5.3)
PROTHROM AB SERPL-ACNC: 11.6 SEC — SIGNIFICANT CHANGE UP (ref 10.6–13.6)
RBC # BLD: 3.11 M/UL — LOW (ref 4.2–5.8)
RBC # FLD: 15.9 % — HIGH (ref 10.3–14.5)
SODIUM SERPL-SCNC: 132 MMOL/L — LOW (ref 135–145)
WBC # BLD: 20.41 K/UL — HIGH (ref 3.8–10.5)
WBC # FLD AUTO: 20.41 K/UL — HIGH (ref 3.8–10.5)

## 2021-12-03 PROCEDURE — 71045 X-RAY EXAM CHEST 1 VIEW: CPT | Mod: 26

## 2021-12-03 PROCEDURE — 99233 SBSQ HOSP IP/OBS HIGH 50: CPT

## 2021-12-03 PROCEDURE — 71250 CT THORAX DX C-: CPT | Mod: 26

## 2021-12-03 PROCEDURE — 70498 CT ANGIOGRAPHY NECK: CPT | Mod: 26

## 2021-12-03 PROCEDURE — 88112 CYTOPATH CELL ENHANCE TECH: CPT | Mod: 26

## 2021-12-03 PROCEDURE — 99232 SBSQ HOSP IP/OBS MODERATE 35: CPT

## 2021-12-03 PROCEDURE — 99223 1ST HOSP IP/OBS HIGH 75: CPT

## 2021-12-03 RX ORDER — ASCORBIC ACID 60 MG
500 TABLET,CHEWABLE ORAL DAILY
Refills: 0 | Status: DISCONTINUED | OUTPATIENT
Start: 2021-12-03 | End: 2021-12-09

## 2021-12-03 RX ORDER — SODIUM CHLORIDE 9 MG/ML
1000 INJECTION, SOLUTION INTRAVENOUS
Refills: 0 | Status: DISCONTINUED | OUTPATIENT
Start: 2021-12-04 | End: 2021-12-05

## 2021-12-03 RX ADMIN — CHLORHEXIDINE GLUCONATE 1 APPLICATION(S): 213 SOLUTION TOPICAL at 06:11

## 2021-12-03 RX ADMIN — TRAMADOL HYDROCHLORIDE 50 MILLIGRAM(S): 50 TABLET ORAL at 03:45

## 2021-12-03 RX ADMIN — TRAMADOL HYDROCHLORIDE 50 MILLIGRAM(S): 50 TABLET ORAL at 20:40

## 2021-12-03 RX ADMIN — Medication 3 MILLILITER(S): at 17:36

## 2021-12-03 RX ADMIN — CEFTRIAXONE 100 MILLIGRAM(S): 500 INJECTION, POWDER, FOR SOLUTION INTRAMUSCULAR; INTRAVENOUS at 14:38

## 2021-12-03 RX ADMIN — ENOXAPARIN SODIUM 40 MILLIGRAM(S): 100 INJECTION SUBCUTANEOUS at 13:58

## 2021-12-03 RX ADMIN — Medication 3 MILLILITER(S): at 10:50

## 2021-12-03 RX ADMIN — Medication 0.5 MILLIGRAM(S): at 06:52

## 2021-12-03 RX ADMIN — SENNA PLUS 2 TABLET(S): 8.6 TABLET ORAL at 21:19

## 2021-12-03 RX ADMIN — OLANZAPINE 15 MILLIGRAM(S): 15 TABLET, FILM COATED ORAL at 21:11

## 2021-12-03 RX ADMIN — LIDOCAINE 1 PATCH: 4 CREAM TOPICAL at 21:18

## 2021-12-03 RX ADMIN — TRAMADOL HYDROCHLORIDE 50 MILLIGRAM(S): 50 TABLET ORAL at 19:41

## 2021-12-03 RX ADMIN — POLYETHYLENE GLYCOL 3350 17 GRAM(S): 17 POWDER, FOR SOLUTION ORAL at 09:18

## 2021-12-03 RX ADMIN — Medication 1 PATCH: at 21:24

## 2021-12-03 RX ADMIN — AZITHROMYCIN 250 MILLIGRAM(S): 500 TABLET, FILM COATED ORAL at 15:50

## 2021-12-03 RX ADMIN — Medication 500 MILLIGRAM(S): at 21:19

## 2021-12-03 RX ADMIN — LIDOCAINE 1 PATCH: 4 CREAM TOPICAL at 07:09

## 2021-12-03 RX ADMIN — Medication 3 MILLILITER(S): at 02:20

## 2021-12-03 RX ADMIN — Medication 1 PATCH: at 19:44

## 2021-12-03 RX ADMIN — Medication 400 MILLIGRAM(S): at 13:58

## 2021-12-03 RX ADMIN — LIDOCAINE 1 PATCH: 4 CREAM TOPICAL at 09:26

## 2021-12-03 RX ADMIN — Medication 1 PATCH: at 21:12

## 2021-12-03 RX ADMIN — Medication 3 MILLILITER(S): at 14:33

## 2021-12-03 RX ADMIN — Medication 1 PATCH: at 07:10

## 2021-12-03 RX ADMIN — TRAMADOL HYDROCHLORIDE 50 MILLIGRAM(S): 50 TABLET ORAL at 03:02

## 2021-12-03 RX ADMIN — HYDROMORPHONE HYDROCHLORIDE 0.25 MILLIGRAM(S): 2 INJECTION INTRAMUSCULAR; INTRAVENOUS; SUBCUTANEOUS at 14:34

## 2021-12-03 RX ADMIN — TRAMADOL HYDROCHLORIDE 50 MILLIGRAM(S): 50 TABLET ORAL at 09:21

## 2021-12-03 RX ADMIN — Medication 20 MILLIGRAM(S): at 09:18

## 2021-12-03 RX ADMIN — SODIUM CHLORIDE 75 MILLILITER(S): 9 INJECTION, SOLUTION INTRAVENOUS at 09:19

## 2021-12-03 RX ADMIN — HYDROMORPHONE HYDROCHLORIDE 0.25 MILLIGRAM(S): 2 INJECTION INTRAMUSCULAR; INTRAVENOUS; SUBCUTANEOUS at 14:19

## 2021-12-03 RX ADMIN — Medication 20 MILLIGRAM(S): at 03:01

## 2021-12-03 RX ADMIN — Medication 20 MILLIGRAM(S): at 17:40

## 2021-12-03 RX ADMIN — Medication 0.5 MILLIGRAM(S): at 17:37

## 2021-12-03 RX ADMIN — Medication 3 MILLILITER(S): at 06:52

## 2021-12-03 RX ADMIN — Medication 1000 MILLIGRAM(S): at 14:13

## 2021-12-03 RX ADMIN — Medication 1000 MILLIGRAM(S): at 23:30

## 2021-12-03 RX ADMIN — DIVALPROEX SODIUM 1000 MILLIGRAM(S): 500 TABLET, DELAYED RELEASE ORAL at 21:11

## 2021-12-03 RX ADMIN — TRAMADOL HYDROCHLORIDE 50 MILLIGRAM(S): 50 TABLET ORAL at 09:51

## 2021-12-03 RX ADMIN — Medication 400 MILLIGRAM(S): at 23:06

## 2021-12-03 NOTE — DIETITIAN INITIAL EVALUATION ADULT. - REASON INDICATOR FOR ASSESSMENT
Nutrition Consult for Pressure Injury >Stage 2 received and appreciated.   Information obtained from: medical record, communication with team. Nutrition Consult for Pressure Injury >Stage 2 received and appreciated.   Information obtained from: RN, medical record, communication with team.

## 2021-12-03 NOTE — PROGRESS NOTE ADULT - SUBJECTIVE AND OBJECTIVE BOX
SURGERY PROGRESS NOTE  Hospital Day #2    SUBJECTIVE  Pt seen and examined at bedside. No complaints.  No acute events overnight.     24 HOUR EVENTS:  - Concern for COPD exacerbation so started on Solu-Medrol 20 mg q8hrs, increased Duoneb standing from q6hrs -> q4hrs, and started azithromycin & ceftriaxone  - Weaned from BiPAP to HFNC 50 LPM/90% FiO2  - Blood and sputum cultures sent  - Advanced to regular diet  - Hyponatremic so urine electrolytes sent and cause likely due to HCTZ use  - Started Lovenox for VTE prophylaxis as HCT was stable from 27 -> 286.8 -> 25.7  - Neurosurgery consulted & following for left C3 facet fracture  - Hospitalist consulted & following  - Tertiary exam done  - Changed NS at 100 mL/hr -> D5NS at 75 mL/hr      OBJECTIVE:    PHYSICAL EXAM   General Appearance: Appears well, NAD  Resp: Patent airway, non-labored breathing  CV: RRR  Abdomen: Soft, Nontender, Nondistended    Vital Signs Last 24 Hrs  T(C): 37 (03 Dec 2021 15:00), Max: 37.2 (02 Dec 2021 19:00)  T(F): 98.6 (03 Dec 2021 15:00), Max: 99 (02 Dec 2021 19:00)  HR: 82 (03 Dec 2021 16:00) (78 - 110)  BP: 148/76 (03 Dec 2021 16:00) (138/71 - 162/82)  BP(mean): 105 (03 Dec 2021 16:00) (97 - 115)  RR: 21 (03 Dec 2021 16:00) (19 - 42)  SpO2: 92% (03 Dec 2021 16:00) (88% - 100%)    I's & O's    12-02-21 @ 07:01  -  12-03-21 @ 07:00  --------------------------------------------------------  IN:    dextrose 5% + sodium chloride 0.9%: 975 mL    IV PiggyBack: 500 mL    Oral Fluid: 240 mL    sodium chloride 0.9%: 200 mL  Total IN: 1915 mL    OUT:    Indwelling Catheter - Urethral (mL): 1300 mL  Total OUT: 1300 mL    Total NET: 615 mL      12-03-21 @ 07:01  -  12-03-21 @ 16:54  --------------------------------------------------------  IN:    dextrose 5% + sodium chloride 0.9%: 375 mL    IV PiggyBack: 150 mL    Oral Fluid: 600 mL  Total IN: 1125 mL    OUT:    Indwelling Catheter - Urethral (mL): 440 mL  Total OUT: 440 mL    Total NET: 685 mL      MEDICATIONS:  DVT PROPHYLAXIS: enoxaparin Injectable 40 milliGRAM(s)     ANTIBIOTICS:   azithromycin  IVPB 250 milliGRAM(s)  cefTRIAXone   IVPB 1000 milliGRAM(s)        LAB/STUDIES:                        8.7    20.41 )-----------( 338      ( 03 Dec 2021 00:26 )             25.7     132<L>  |  99  |  29<H>  ----------------------------<  145<H>  4.4   |  21<L>  |  0.83    Ca    8.5      03 Dec 2021 00:26  Phos  3.3     12-03  Mg     2.0     12-03    TPro  5.7<L>  /  Alb  3.1<L>  /  TBili  0.4  /  DBili  0.2  /  AST  26  /  ALT  22  /  AlkPhos  59  12-01    PT/INR - ( 03 Dec 2021 00:26 )   PT: 11.6 sec;   INR: 0.97 ratio    PTT - ( 03 Dec 2021 00:26 )  PTT:26.7 sec    LIVER FUNCTIONS - ( 01 Dec 2021 23:44 )  Alb: 3.1 g/dL / Pro: 5.7 g/dL / ALK PHOS: 59 U/L / ALT: 22 U/L / AST: 26 U/L / GGT: x           Urinalysis Basic - ( 01 Dec 2021 23:45 )    Color: Yellow / Appearance: Clear / SG: >1.050 / pH: x  Gluc: x / Ketone: Negative  / Bili: Negative / Urobili: 2 mg/dL   Blood: x / Protein: Trace / Nitrite: Negative   Leuk Esterase: Negative / RBC: 1 /hpf / WBC 1 /HPF   Sq Epi: x / Non Sq Epi: 1 /hpf / Bacteria: Negative        ABG - ( 03 Dec 2021 00:30 )  pH, Arterial: 7.42  pH, Blood: x     /  pCO2: 37    /  pO2: 121   / HCO3: 24    / Base Excess: -0.3  /  SaO2: 99.7          Culture - Sputum (collected 02 Dec 2021 19:02)  Source: .Sputum Sputum  Gram Stain (02 Dec 2021 22:46):    Numerous polymorphonuclear leukocytes per low power field    Few Squamous epithelial cells per low power field    Moderate Gram Positive Cocci in Pairs and Chains per oil power field    Few Gram Negative Diplococci per oil power field    Rare Gram Negative Rods per oil power field

## 2021-12-03 NOTE — CONSULT NOTE ADULT - CONSULT REASON
R IT fx
trauma eval
Pre-operative evaluation
Cervical fracture
left renal pelvis thickening and retroperitoneal lymph node
Medical co-management

## 2021-12-03 NOTE — CONSULT NOTE ADULT - SUBJECTIVE AND OBJECTIVE BOX
CHIEF COMPLAINT: Trauma    HPI: 72 y/o M smoker w/recently diagnosed COPD, schizophrenia who was admitted to OSH following trauma where workup was positive for right comminuted intertrochanteric fracture and was awaiting ORIF when patient developed acute hypoxemic respiratory failure, CTs were done showing multiple rib fractures, cervical fractures, small hemopneumothorax, pneumomediastinum and grade 3 liver laceration so patient was transferred to [facility] for further management.     REVIEW OF SYSTEMS:  See above. ROS otherwise negative    PAST MEDICAL & SURGICAL HISTORY:  Schizophrenia  HTN  COPD    FAMILY HISTORY:  No known familiy history of lung cancer    SOCIAL HISTORY:  smoke ~ 1/2 PPD for > 25 years    Allergies    No Known Allergies    Intolerances        HOME MEDICATIONS:  Home Medications:  amLODIPine 10 mg oral tablet: 1 tab(s) orally once a day (01 Dec 2021 22:49)  cloNIDine 0.3 mg oral tablet: 0.3 milligram(s) orally 3 times a day (01 Dec 2021 22:51)  Colace: 200 milligram(s) orally once a day (at bedtime) (01 Dec 2021 22:52)  Depakote ER: 1000 milligram(s) orally once a day (01 Dec 2021 22:49)  Dyazide 25 mg-37.5 mg oral capsule: 1 cap(s) orally once a day (01 Dec 2021 22:51)  enalapril: 40 milligram(s) orally once a day (01 Dec 2021 22:50)  OLANZapine 15 mg oral tablet: 1 tab(s) orally once a day (01 Dec 2021 22:50)          OBJECTIVE:  ICU Vital Signs Last 24 Hrs  T(C): 37.1 (03 Dec 2021 11:00), Max: 37.2 (02 Dec 2021 19:00)  T(F): 98.8 (03 Dec 2021 11:00), Max: 99 (02 Dec 2021 19:00)  HR: 97 (03 Dec 2021 13:00) (78 - 112)  BP: 158/76 (03 Dec 2021 13:00) (129/70 - 162/82)  BP(mean): 109 (03 Dec 2021 13:00) (92 - 115)  ABP: --  ABP(mean): --  RR: 19 (03 Dec 2021 13:00) (19 - 42)  SpO2: 89% (03 Dec 2021 13:00) (88% - 100%)        12-02 @ 07:01 - 12-03 @ 07:00  --------------------------------------------------------  IN: 1915 mL / OUT: 1300 mL / NET: 615 mL    12-03 @ 07:01 - 12-03 @ 14:31  --------------------------------------------------------  IN: 855 mL / OUT: 345 mL / NET: 510 mL      CAPILLARY BLOOD GLUCOSE          PHYSICAL EXAM:  General:   HEENT:   Lymph Nodes:  Neck:   Respiratory:   Cardiovascular:   Abdomen:   Extremities:   Skin:   Neurological:  Psychiatry:    HOSPITAL MEDICATIONS:  Standing Meds:  albuterol/ipratropium for Nebulization 3 milliLiter(s) Nebulizer every 4 hours  azithromycin  IVPB      azithromycin  IVPB 250 milliGRAM(s) IV Intermittent every 24 hours  buDESOnide    Inhalation Suspension 0.5 milliGRAM(s) Inhalation every 12 hours  cefTRIAXone   IVPB 1000 milliGRAM(s) IV Intermittent every 24 hours  cefTRIAXone   IVPB      chlorhexidine 2% Cloths 1 Application(s) Topical <User Schedule>  diVALproex ER 1000 milliGRAM(s) Oral every 24 hours  enoxaparin Injectable 40 milliGRAM(s) SubCutaneous every 24 hours  lidocaine   4% Patch 1 Patch Transdermal daily  methylPREDNISolone sodium succinate Injectable 20 milliGRAM(s) IV Push every 8 hours  nicotine -  14 mG/24Hr(s) Patch 1 patch Transdermal daily  OLANZapine 15 milliGRAM(s) Oral at bedtime  polyethylene glycol 3350 17 Gram(s) Oral daily  senna 2 Tablet(s) Oral at bedtime      PRN Meds:  acetaminophen   IVPB .. 1000 milliGRAM(s) IV Intermittent every 6 hours PRN  HYDROmorphone  Injectable 0.25 milliGRAM(s) IV Push every 4 hours PRN  traMADol 25 milliGRAM(s) Oral every 6 hours PRN  traMADol 50 milliGRAM(s) Oral every 6 hours PRN      LABS:                        8.7    20.41 )-----------( 338      ( 03 Dec 2021 00:26 )             25.7     Hgb Trend: 8.7<--, 8.9<--, 9.1<--, 9.2<--, 10.8<--  12-03    132<L>  |  99  |  29<H>  ----------------------------<  145<H>  4.4   |  21<L>  |  0.83    Ca    8.5      03 Dec 2021 00:26  Phos  3.3     12-03  Mg     2.0     12-03    TPro  5.7<L>  /  Alb  3.1<L>  /  TBili  0.4  /  DBili  0.2  /  AST  26  /  ALT  22  /  AlkPhos  59  12-01    Creatinine Trend: 0.83<--, 0.96<--, 1.04<--  PT/INR - ( 03 Dec 2021 00:26 )   PT: 11.6 sec;   INR: 0.97 ratio         PTT - ( 03 Dec 2021 00:26 )  PTT:26.7 sec  Urinalysis Basic - ( 01 Dec 2021 23:45 )    Color: Yellow / Appearance: Clear / SG: >1.050 / pH: x  Gluc: x / Ketone: Negative  / Bili: Negative / Urobili: 2 mg/dL   Blood: x / Protein: Trace / Nitrite: Negative   Leuk Esterase: Negative / RBC: 1 /hpf / WBC 1 /HPF   Sq Epi: x / Non Sq Epi: 1 /hpf / Bacteria: Negative      Arterial Blood Gas:  12-03 @ 00:30  7.42/37/121/24/99.7/-0.3  ABG lactate: --  Arterial Blood Gas:  12-02 @ 11:39  7.45/33/71/23/95.6/-0.7  ABG lactate: --  Arterial Blood Gas:  12-02 @ 03:44  7.43/32/64/21/93.2/-2.3  ABG lactate: --  Arterial Blood Gas:  12-01 @ 23:41  7.44/35/70/24/95.8/-0.1  ABG lactate: --  Arterial Blood Gas:  12-01 @ 19:21  7.44/33/64/22/93.3/-1.3  ABG lactate: --        MICROBIOLOGY:     Culture - Sputum (collected 02 Dec 2021 19:02)  Source: .Sputum Sputum  Gram Stain (02 Dec 2021 22:46):    Numerous polymorphonuclear leukocytes per low power field    Few Squamous epithelial cells per low power field    Moderate Gram Positive Cocci in Pairs and Chains per oil power field    Few Gram Negative Diplococci per oil power field    Rare Gram NegativeRods per oil power field        RADIOLOGY:  [ x] Reviewed and interpreted by me    PULMONARY FUNCTION TESTS:    EKG:   CHIEF COMPLAINT: Trauma    HPI: 72 y/o M smoker w/recently diagnosed COPD, schizophrenia who was admitted to OSH following trauma where workup was positive for right comminuted intertrochanteric fracture and was awaiting ORIF when patient developed acute hypoxemic respiratory failure, CTs were done showing multiple rib fractures, cervical fractures, small hemopneumothorax, pneumomediastinum and grade 3 liver laceration so patient was transferred to [facility] for further management. Patient currently feels ok. Breathing was worse previously, but is now comfortable on HFNC. Was previously having abdominal pain with deep breathing. Patient reports chronic cough productive of yellowish sputum. No chance in frequency of cough, color of sputum, or amount of sputum. Does not taking any medications for breathing at home. No chest pain, fevers, chills, nausea, emesis, or, diarrhea.    REVIEW OF SYSTEMS:  See above. ROS otherwise negative    PAST MEDICAL & SURGICAL HISTORY:  Schizophrenia  HTN  COPD    FAMILY HISTORY:  No known familiy history of lung cancer    SOCIAL HISTORY:  smoke ~ 1/2 PPD for > 25 years    Allergies    No Known Allergies    Intolerances        HOME MEDICATIONS:  Home Medications:  amLODIPine 10 mg oral tablet: 1 tab(s) orally once a day (01 Dec 2021 22:49)  cloNIDine 0.3 mg oral tablet: 0.3 milligram(s) orally 3 times a day (01 Dec 2021 22:51)  Colace: 200 milligram(s) orally once a day (at bedtime) (01 Dec 2021 22:52)  Depakote ER: 1000 milligram(s) orally once a day (01 Dec 2021 22:49)  Dyazide 25 mg-37.5 mg oral capsule: 1 cap(s) orally once a day (01 Dec 2021 22:51)  enalapril: 40 milligram(s) orally once a day (01 Dec 2021 22:50)  OLANZapine 15 mg oral tablet: 1 tab(s) orally once a day (01 Dec 2021 22:50)          OBJECTIVE:  ICU Vital Signs Last 24 Hrs  T(C): 37.1 (03 Dec 2021 11:00), Max: 37.2 (02 Dec 2021 19:00)  T(F): 98.8 (03 Dec 2021 11:00), Max: 99 (02 Dec 2021 19:00)  HR: 97 (03 Dec 2021 13:00) (78 - 112)  BP: 158/76 (03 Dec 2021 13:00) (129/70 - 162/82)  BP(mean): 109 (03 Dec 2021 13:00) (92 - 115)  ABP: --  ABP(mean): --  RR: 19 (03 Dec 2021 13:00) (19 - 42)  SpO2: 89% (03 Dec 2021 13:00) (88% - 100%)        12-02 @ 07:01  -  12-03 @ 07:00  --------------------------------------------------------  IN: 1915 mL / OUT: 1300 mL / NET: 615 mL    12-03 @ 07:01  -  12-03 @ 14:31  --------------------------------------------------------  IN: 855 mL / OUT: 345 mL / NET: 510 mL      CAPILLARY BLOOD GLUCOSE          PHYSICAL EXAM:  General: Adult male lying comfortably in bed, NAD  HEENT: NC, sclerae anicteric. HFNC in place  Neck: C-collar in place  Respiratory: No increased WOB, able to speak full sentences, no w/c/r. Decreased breath sounds at bases  Cardiovascular: S1, S2  Abdomen: Soft, + BS  Extremities: WWP  Neurological: Awake, alert, follows commands  Psychiatry: Appropriate affect    HOSPITAL MEDICATIONS:  Standing Meds:  albuterol/ipratropium for Nebulization 3 milliLiter(s) Nebulizer every 4 hours  azithromycin  IVPB      azithromycin  IVPB 250 milliGRAM(s) IV Intermittent every 24 hours  buDESOnide    Inhalation Suspension 0.5 milliGRAM(s) Inhalation every 12 hours  cefTRIAXone   IVPB 1000 milliGRAM(s) IV Intermittent every 24 hours  cefTRIAXone   IVPB      chlorhexidine 2% Cloths 1 Application(s) Topical <User Schedule>  diVALproex ER 1000 milliGRAM(s) Oral every 24 hours  enoxaparin Injectable 40 milliGRAM(s) SubCutaneous every 24 hours  lidocaine   4% Patch 1 Patch Transdermal daily  methylPREDNISolone sodium succinate Injectable 20 milliGRAM(s) IV Push every 8 hours  nicotine -  14 mG/24Hr(s) Patch 1 patch Transdermal daily  OLANZapine 15 milliGRAM(s) Oral at bedtime  polyethylene glycol 3350 17 Gram(s) Oral daily  senna 2 Tablet(s) Oral at bedtime      PRN Meds:  acetaminophen   IVPB .. 1000 milliGRAM(s) IV Intermittent every 6 hours PRN  HYDROmorphone  Injectable 0.25 milliGRAM(s) IV Push every 4 hours PRN  traMADol 25 milliGRAM(s) Oral every 6 hours PRN  traMADol 50 milliGRAM(s) Oral every 6 hours PRN      LABS:                        8.7    20.41 )-----------( 338      ( 03 Dec 2021 00:26 )             25.7     Hgb Trend: 8.7<--, 8.9<--, 9.1<--, 9.2<--, 10.8<--  12-03    132<L>  |  99  |  29<H>  ----------------------------<  145<H>  4.4   |  21<L>  |  0.83    Ca    8.5      03 Dec 2021 00:26  Phos  3.3     12-03  Mg     2.0     12-03    TPro  5.7<L>  /  Alb  3.1<L>  /  TBili  0.4  /  DBili  0.2  /  AST  26  /  ALT  22  /  AlkPhos  59  12-01    Creatinine Trend: 0.83<--, 0.96<--, 1.04<--  PT/INR - ( 03 Dec 2021 00:26 )   PT: 11.6 sec;   INR: 0.97 ratio         PTT - ( 03 Dec 2021 00:26 )  PTT:26.7 sec  Urinalysis Basic - ( 01 Dec 2021 23:45 )    Color: Yellow / Appearance: Clear / SG: >1.050 / pH: x  Gluc: x / Ketone: Negative  / Bili: Negative / Urobili: 2 mg/dL   Blood: x / Protein: Trace / Nitrite: Negative   Leuk Esterase: Negative / RBC: 1 /hpf / WBC 1 /HPF   Sq Epi: x / Non Sq Epi: 1 /hpf / Bacteria: Negative      Arterial Blood Gas:  12-03 @ 00:30  7.42/37/121/24/99.7/-0.3  ABG lactate: --  Arterial Blood Gas:  12-02 @ 11:39  7.45/33/71/23/95.6/-0.7  ABG lactate: --  Arterial Blood Gas:  12-02 @ 03:44  7.43/32/64/21/93.2/-2.3  ABG lactate: --  Arterial Blood Gas:  12-01 @ 23:41  7.44/35/70/24/95.8/-0.1  ABG lactate: --  Arterial Blood Gas:  12-01 @ 19:21  7.44/33/64/22/93.3/-1.3  ABG lactate: --        MICROBIOLOGY:     Culture - Sputum (collected 02 Dec 2021 19:02)  Source: .Sputum Sputum  Gram Stain (02 Dec 2021 22:46):    Numerous polymorphonuclear leukocytes per low power field    Few Squamous epithelial cells per low power field    Moderate Gram Positive Cocci in Pairs and Chains per oil power field    Few Gram Negative Diplococci per oil power field    Rare Gram NegativeRods per oil power field        RADIOLOGY:  [ x] Reviewed and interpreted by me    PULMONARY FUNCTION TESTS:    EKG:

## 2021-12-03 NOTE — PROGRESS NOTE ADULT - ASSESSMENT
72 y/o male w/ a PMHx of schizophrenia, HTN, current smoker of 1/2 PPD, and COPD presenting s/p fall w/ a left C3 facet fracture, right 8th-10th rib fractures w/ associated hemopneumothorax & pneumomediastinum, and a grade 3 liver laceration with a hospital course c/b acute hypoxemic respiratory failure likely 2/2 a COPD exacerbation.      PLAN  - OOBAT, IS  - wean off HFNC  - f/u AM labs, monitor H&H  - multimodal pain control PRN  - medical clearance for hip IMN with ortho      ACS  [phone]

## 2021-12-03 NOTE — DISCHARGE NOTE PROVIDER - HOSPITAL COURSE
Patient is a 71y Male with PMH of Schizophrenia, HTN, current smoker (1/2pk a day), new dx of COPD who was initially admitted to [facility] after a fall on the sun deck at [facility] with cc of hip pain. Patient found to have Right comminuted Intertrochanteric fracture and was suppose to go for ORIF on 12/2. CXR showed mild right apical pneumothorax, persistent opacities. While on the floor, patient dessated, RRT was called, General surgery was called, patient then had serial CT done which revealed 4 right rib fx, with hemopneumothorax and pneumomediastinum and grade 3 liver laceration and no pulmonary embolism. Patient then transferred to [facility] trauma, for possible IR intervention for grade 3 liver laceration. Patient admitted to SICU, hypoxic on HFNC, c/o right hip pain.    Patient is a 71y Male with PMH of Schizophrenia, HTN, current smoker (1/2pk a day), new dx of COPD who was initially admitted to [facility] after a fall on the sun deck at [facility] with cc of hip pain. Patient found to have Right comminuted Intertrochanteric fracture and was suppose to go for ORIF on 12/2. CXR showed mild right apical pneumothorax, persistent opacities. While on the floor, patient dessated, RRT was called, General surgery was called, patient then had serial CT done which revealed 4 right rib fx, with hemopneumothorax and pneumomediastinum and grade 3 liver laceration and no pulmonary embolism. Patient then transferred to [facility] trauma, for possible IR intervention for grade 3 liver laceration. Patient admitted to SICU, hypoxic on HFNC, c/o right hip pain.     HD2  - Concern for COPD exacerbation so started on Solu-Medrol 20 mg q8hrs, increased Duoneb standing from q6hrs -> q4hrs, and started azithromycin & ceftriaxone  - Weaned from BiPAP to HFNC 50 LPM/90% FiO2  - Blood and sputum cultures sent  - Advanced to regular diet  - Hyponatremic so urine electrolytes sent and cause likely due to HCTZ use  - Started Lovenox for VTE prophylaxis as HCT was stable from 27 -> 286.8 -> 25.7  - Neurosurgery consulted & following for left C3 facet fracture  - Hospitalist consulted & following  - Tertiary exam done  - Changed NS at 100 mL/hr -> D5NS at 75 mL/hr    HD3  -OR 12/4 with ortho pending pulm clearance (f/u repeat CT chest official read)  -Uro: urine cytology (ord), rest of w/u outpt    HD4  -CTA neck done, pending official read  - OR with ortho 12/4 for IMN  - lopressor started, 25 BID  - 500 cc bolus for decreased UOP    HD5  - CTA neck negative for fracture/vascular injury  - amlodipine 5 started  - weaned to nasal cannula  - Urine legionella negative  - 12/2 Sputum Cx with Moraxella catarrhalis, H. flu, and strep pneumo    HD6  - Constipated so increased Miralax from daily -> BID, added lactulose 10 g q6hrs, and gave a Dulcolax suppository x1  - Decreased Duoneb frequency from q4hrs -> q6hrs  - Changed Pulmicort to Symbicort  - TTE demonstrated normal LV and grossly normal RV  - Episode of desaturation overnight w/ SpO2 of 86% on 8 L of nasal cannula but refusing high flow nasal cannula and all nebulizers    HD7 - Patient stable and transferred from sicu to floor.  Patient WBC elevated out of proportion while on steroids.  - UA negative, CXR clear, Bcx currently NGTD  - Procalcitonin WNL  - Monitoring off abx at this time.    HD8- WBC downtrending.  Patient given laxatives to have BM.  At the time of discharge, the patient was hemodynamically stable, was tolerating PO diet, was voiding urine and passing stool, was ambulating, and was comfortable with adequate pain control. The patient was instructed to follow up with ACS, ORTHO, NSX, UROLOGY, PMD  within 1-2 weeks after discharge from the hospital. The patient/family felt comfortable with discharge. The patient was discharged to rehab. The patient had no other issues.

## 2021-12-03 NOTE — DIETITIAN INITIAL EVALUATION ADULT. - ORAL INTAKE PTA/DIET HISTORY
Diet History:   No chewing/swallowing difficulty reported.   Allergies: [facility]  Home Nutrition Supplements: none noted Diet History: unavailable  No h/o swallowing difficulty reported.  Allergies: FA  Home Nutrition Supplements: none noted

## 2021-12-03 NOTE — DIETITIAN INITIAL EVALUATION ADULT. - PERTINENT LABORATORY DATA
12-03 @ 00:26: Sodium 132<L>, Potassium 4.4, Calcium 8.5, Magnesium 2.0, Phosphorus 3.3, BUN 29<H>, Creatinine 0.83, Glucose 145<H>  Hemoglobin : 8.7 g/dL  Hematocrit : 25.7 %    LIVER FUNCTIONS - ( 01 Dec 2021 23:44 )  Alb: 3.1 g/dL / Pro: 5.7 g/dL / ALK PHOS: 59 U/L / ALT: 22 U/L / AST: 26 U/L / GGT: x

## 2021-12-03 NOTE — PATIENT PROFILE ADULT - FALL HARM RISK - HARM RISK INTERVENTIONS

## 2021-12-03 NOTE — PROGRESS NOTE ADULT - SUBJECTIVE AND OBJECTIVE BOX
Oscar Hernandez MD  Division of Hospital Medicine  Pager [phone]  If no response or off hours page: [phone]  ---------------------------------------------------------    CONNER PATINO  71y  Male      Patient is a 71y old  Male who presents with a chief complaint of R IT fx (03 Dec 2021 06:40)      INTERVAL HPI/OVERNIGHT EVENTS:  Was on BIPAP overnight weaned to HFNC this am. States coughing less. Pain okay      REVIEW OF SYSTEMS: 10 point ROS negative unless listed above    T(C): 37.1 (12-03-21 @ 11:00), Max: 37.2 (12-02-21 @ 19:00)  HR: 93 (12-03-21 @ 11:00) (78 - 112)  BP: 162/82 (12-03-21 @ 11:00) (129/70 - 162/82)  RR: 20 (12-03-21 @ 11:00) (19 - 42)  SpO2: 89% (12-03-21 @ 11:00) (88% - 100%)  Wt(kg): --Vital Signs Last 24 Hrs  T(C): 37.1 (03 Dec 2021 11:00), Max: 37.2 (02 Dec 2021 19:00)  T(F): 98.8 (03 Dec 2021 11:00), Max: 99 (02 Dec 2021 19:00)  HR: 93 (03 Dec 2021 11:00) (78 - 112)  BP: 162/82 (03 Dec 2021 11:00) (129/70 - 162/82)  BP(mean): 114 (03 Dec 2021 11:00) (92 - 115)  RR: 20 (03 Dec 2021 11:00) (19 - 42)  SpO2: 89% (03 Dec 2021 11:00) (88% - 100%)    PHYSICAL EXAM:  GENERAL: NAD  EYES: EOMI  NECK: C-collar  CHEST/LUNG: Course breath sounds bilaterally   HEART: Reg rate. No M/R/G.  ABDOMEN: Soft, NT/ND, Bowel sounds present  EXTREMITIES:  2+ Peripheral Pulses, No clubbing, cyanosis, or edema  PSYCH: AAOx3  NEUROLOGY: non-focal  SKIN: No rashes or lesions    Consultant(s) Notes Reviewed:  [x ] YES  [ ] NO  Care Discussed with Consultants/Other Providers [ x] YES  [ ] NO    LABS:                        8.7    20.41 )-----------( 338      ( 03 Dec 2021 00:26 )             25.7     12-03    132<L>  |  99  |  29<H>  ----------------------------<  145<H>  4.4   |  21<L>  |  0.83    Ca    8.5      03 Dec 2021 00:26  Phos  3.3     12-03  Mg     2.0     12-03    TPro  5.7<L>  /  Alb  3.1<L>  /  TBili  0.4  /  DBili  0.2  /  AST  26  /  ALT  22  /  AlkPhos  59  12-01    PT/INR - ( 03 Dec 2021 00:26 )   PT: 11.6 sec;   INR: 0.97 ratio         PTT - ( 03 Dec 2021 00:26 )  PTT:26.7 sec  Urinalysis Basic - ( 01 Dec 2021 23:45 )    Color: Yellow / Appearance: Clear / SG: >1.050 / pH: x  Gluc: x / Ketone: Negative  / Bili: Negative / Urobili: 2 mg/dL   Blood: x / Protein: Trace / Nitrite: Negative   Leuk Esterase: Negative / RBC: 1 /hpf / WBC 1 /HPF   Sq Epi: x / Non Sq Epi: 1 /hpf / Bacteria: Negative      CAPILLARY BLOOD GLUCOSE          ABG - ( 03 Dec 2021 00:30 )  pH, Arterial: 7.42  pH, Blood: x     /  pCO2: 37    /  pO2: 121   / HCO3: 24    / Base Excess: -0.3  /  SaO2: 99.7              Urinalysis Basic - ( 01 Dec 2021 23:45 )    Color: Yellow / Appearance: Clear / SG: >1.050 / pH: x  Gluc: x / Ketone: Negative  / Bili: Negative / Urobili: 2 mg/dL   Blood: x / Protein: Trace / Nitrite: Negative   Leuk Esterase: Negative / RBC: 1 /hpf / WBC 1 /HPF   Sq Epi: x / Non Sq Epi: 1 /hpf / Bacteria: Negative        RADIOLOGY & ADDITIONAL TESTS:    Imaging Personally Reviewed:  [ x] YES  [ ] NO

## 2021-12-03 NOTE — CONSULT NOTE ADULT - ASSESSMENT
72 yo male with findings of left renal pelvis thickening and left retroperitoneal lymphadenopathy of unknown etiology.  With pts smoking hx malignancy is of concern.    May obtain urine cystology.  workup may be done as outpt, but if primary team wishes to obtain pathology would consider IR lymph node bx.    72 yo male with findings of left renal pelvis thickening and left retroperitoneal lymphadenopathy of unknown etiology. No hematuria, + h/o smoking.   pelvis thickening is hard to discern, no emergent intervention required, may obtain urine cytology, but workup will be as outpatient.     May obtain urine cystology.  workup may be done as outpt, but if primary team wishes to obtain pathology would consider IR lymph node bx.       Urology  [facility]  [address] [place] [address], [place] [id number] [phone]

## 2021-12-03 NOTE — PROGRESS NOTE ADULT - SUBJECTIVE AND OBJECTIVE BOX
No acute events overnight. Pain controlled. Endorses mild dyspnea, on HFNC. Denies fevers/chills or chest pain.    Vital Signs Last 24 Hrs  T(C): 37 (03 Dec 2021 03:00), Max: 37.2 (02 Dec 2021 19:00)  T(F): 98.6 (03 Dec 2021 03:00), Max: 99 (02 Dec 2021 19:00)  HR: 79 (03 Dec 2021 06:15) (78 - 112)  BP: 161/73 (03 Dec 2021 06:00) (115/55 - 161/73)  BP(mean): 105 (03 Dec 2021 06:00) (79 - 107)  RR: 19 (03 Dec 2021 06:00) (19 - 42)  SpO2: 98% (03 Dec 2021 06:15) (85% - 100%)    PE  Gen: Laying in bed, alert and oriented, NAD, C collar in place  Resp: Unlabored breathing  RLE:  SILT DP/SP/ Adolfo/Saph/Post Tib  +EHL/FHL/TA/Gastroc,   DP+   Soft compartments, no calf ttp                 71yMale with R IT fx  - strict bedrest prior to OR  - Pain control  - IS  - Continue home medications  - Regular Diet, NPOpMN/IVF  - CBC/BMP/Coags/UA/T+S x2  - EKG/CXR  - Medical clearance prior to planned procedure  - Plan for OR 12/4 for R femoral IMN

## 2021-12-03 NOTE — DIETITIAN INITIAL EVALUATION ADULT. - OTHER INFO
GASTROINTESTINAL:  Last BM: none noted  Bowel Regimen: Miralax, senna    NUTRITION STATUS:  - Solu-Medrol ordered  - Pressure injury present on admission    WEIGHT HISTORY: unavailable

## 2021-12-03 NOTE — CHART NOTE - NSCHARTNOTEFT_GEN_A_CORE
For orthopedic surgery, please use glidescope intubation with collar on. Patient does not need to be awake.

## 2021-12-03 NOTE — DISCHARGE NOTE PROVIDER - NSDCFUADDAPPT_GEN_ALL_CORE_FT
Please follow up at the [facility] 1-2 weeks from discharge for incidental finding of left renal pelvis thickening and left retroperitoneal lymphadenopathy of unknown etiology on hospital imaging. [address] [place] [address]. [phone].

## 2021-12-03 NOTE — CONSULT NOTE ADULT - SUBJECTIVE AND OBJECTIVE BOX
70 yo  Male without past  hx here s/p fall with mult injuries in ICU found with left renal pelvis thickening and retroperitoneal lymphadenopathy.  Pt seen and examined. States has not known about this. Prior to arrival has no weight loss, nausea or vomiting, flank pain, hematuria, changes in urination, routine  evaluations  + tobacco  no fam hx of gu malignancy       PAST MEDICAL & SURGICAL HISTORY:      MEDICATIONS  (STANDING):  albuterol/ipratropium for Nebulization 3 milliLiter(s) Nebulizer every 4 hours  azithromycin  IVPB      azithromycin  IVPB 250 milliGRAM(s) IV Intermittent every 24 hours  buDESOnide    Inhalation Suspension 0.5 milliGRAM(s) Inhalation every 12 hours  cefTRIAXone   IVPB 1000 milliGRAM(s) IV Intermittent every 24 hours  cefTRIAXone   IVPB      chlorhexidine 2% Cloths 1 Application(s) Topical <User Schedule>  diVALproex ER 1000 milliGRAM(s) Oral every 24 hours  enoxaparin Injectable 40 milliGRAM(s) SubCutaneous every 24 hours  lidocaine   4% Patch 1 Patch Transdermal daily  methylPREDNISolone sodium succinate Injectable 20 milliGRAM(s) IV Push every 8 hours  nicotine -  14 mG/24Hr(s) Patch 1 patch Transdermal daily  OLANZapine 15 milliGRAM(s) Oral at bedtime  polyethylene glycol 3350 17 Gram(s) Oral daily  senna 2 Tablet(s) Oral at bedtime    MEDICATIONS  (PRN):  acetaminophen   IVPB .. 1000 milliGRAM(s) IV Intermittent every 6 hours PRN Mild Pain (1 - 3)  HYDROmorphone  Injectable 0.25 milliGRAM(s) IV Push every 4 hours PRN breakthrough pain  traMADol 25 milliGRAM(s) Oral every 6 hours PRN Moderate Pain (4 - 6)  traMADol 50 milliGRAM(s) Oral every 6 hours PRN Severe Pain (7 - 10)      FAMILY HISTORY:  as above     Allergies    No Known Allergies    Intolerances        SOCIAL HISTORY:  + tobacco    REVIEW OF SYSTEMS: Otherwise negative as stated in HPI    Physical Exam  Vital signs  T(C): 37.1 (12-03-21 @ 11:00), Max: 37.2 (12-02-21 @ 19:00)  HR: 97 (12-03-21 @ 13:00)  BP: 158/76 (12-03-21 @ 13:00)  SpO2: 89% (12-03-21 @ 13:00)  Wt(kg): --    Output    UOP    Gen:  AWAKE ALERT NAD AXOX3    Pulm:  NO RESP DISTRESS  	  CV:  S1S2    GI:  SOFT NT/ND    BACK: NO CVAT BL     :  NONPALP BLADDER  NESS IN PLACE CIRC PHALLUS  PENOSCROTAL EDEMA                        	      LABS:                          8.7    20.41 )-----------( 338      ( 03 Dec 2021 00:26 )             25.7       12-03    132<L>  |  99  |  29<H>  ----------------------------<  145<H>  4.4   |  21<L>  |  0.83    Ca    8.5      03 Dec 2021 00:26  Phos  3.3     12-03  Mg     2.0     12-03    TPro  5.7<L>  /  Alb  3.1<L>  /  TBili  0.4  /  DBili  0.2  /  AST  26  /  ALT  22  /  AlkPhos  59  12-01    PT/INR - ( 03 Dec 2021 00:26 )   PT: 11.6 sec;   INR: 0.97 ratio         PTT - ( 03 Dec 2021 00:26 )  PTT:26.7 sec  Urinalysis Basic - ( 01 Dec 2021 23:45 )    Color: Yellow / Appearance: Clear / SG: >1.050 / pH: x  Gluc: x / Ketone: Negative  / Bili: Negative / Urobili: 2 mg/dL   Blood: x / Protein: Trace / Nitrite: Negative   Leuk Esterase: Negative / RBC: 1 /hpf / WBC 1 /HPF   Sq Epi: x / Non Sq Epi: 1 /hpf / Bacteria: Negative    RADIOLOGY< from: CT Abdomen and Pelvis w/ IV Cont (12.02.21 @ 01:34) >  PROCEDURE:  CT of the Chest, Abdomen and Pelvis was performed.  Imaging was performed through the chest in the arterial phase followed by imaging of the abdomen and pelvis in the portal venous phase.  Sagittal and coronal reformats were performed.    FINDINGS:  CHEST:  LUNGS AND LARGE AIRWAYS: Partial atelectasis of the right lower lobe. Near complete atelectasis of the left lower lobe.  PLEURA: Small right hemopneumothorax. Trace left pleural fluid.  VESSELS: Atherosclerotic changes of the aorta and coronary arteries.  HEART: Heart size is normal. No pericardial effusion.  MEDIASTINUM AND GUS: No lymphadenopathy.  CHEST WALL AND LOWER NECK: Within normal limits.    ABDOMEN AND PELVIS:  LIVER: Segment 6/7 liver laceration measuring 3.6 cm in depth (grade III).  BILE DUCTS: Normal caliber.  GALLBLADDER: Cholelithiasis.  SPLEEN: Within normal limits.  PANCREAS: Within normal limits.  ADRENALS: Mild bilateral adrenal gland thickening, nonspecific.  KIDNEYS/URETERS: Excreted contrast within the collecting system. No hydronephrosis. Bilateral cysts and hypoattenuating foci which are too small to characterize. Question left renal pelvis urothelial thickening.    BLADDER: Excreted contrast in the bladder.  REPRODUCTIVE ORGANS: Prostate is enlarged.    BOWEL: No bowel obstruction. Appendix within normal limits.  PERITONEUM: No ascites.  VESSELS: Atherosclerotic changes.  RETROPERITONEUM/LYMPH NODES: Few enlarged retroperitoneal lymph nodes. For example, a 2.0 x 1.7 cm at the left renal vein (3, 60).  ABDOMINAL WALL: Incompletely imaged large right inguinal hernia containing nonobstructed small bowel and mesentery.  BONES: Acute displaced posterior right ninth through 10th and nondisplaced right posterior 8th rib fractures. Age indeterminant posterior right 11th, 12th left anterior 3rd through 5th rib fractures. Acute comminuted and impacted right femoral intertrochanteric fracture with lesser trochanter avulsion. Surrounding right hip soft tissue hematoma.    IMPRESSION:  Acute right posterior 8-10th rib fractures. Small right hemopneumothorax. Additional age indeterminant bilateral rib fractures.    Acute right femoral intertrochanteric fracture.    Grade III posterior liver laceration.    Question left sided urothelial thickening.    Retroperitoneal lymphadenopathy of uncertain etiology.    Findings discussed by Dr. KIRKPATRICK with Dr. Stein and trauma surgery resident on 12/2/2021 at 2:30 AM with read back confirmation.      < end of copied text >

## 2021-12-03 NOTE — CHART NOTE - NSCHARTNOTEFT_GEN_A_CORE
Spoke to SICU team regarding pre-operative risk stratification for R ORIF for right comminuted intertrochanteric fracture. Patient was noted to have small, traumatic right sided pneumothorax on CT chest that cannot be visualized on CXR. With an active pneumothorax, the patient remains at risk for worsening pneumothorax and possibly tension physiology when placed on positive pressure and would benefit from a prophylactic chest tube. However, if the pneumothorax has resolved then there is no need for a prophylactic chest tube and the patient would be able to proceed to surgery, albeit at moderate risk due to level of oxygen requirements. Because the procedure at this time is semi-elective and the pneumothorax cannot be visualized on CXR, I would recommend pursuing one of two options:    1) Repeat a CT chest to assess for resolution of pneumothorax  2) Assess for resolution of pneumothorax using lung ultrasound at bedside, which can be performed by myself and the SICU team tomorrow AM    If however the patient's condition changes and the procedure is deemed emergent overnight and the patient must be taken to the OR, then anesthesia must be aware of the possibility of tension physiology and react quickly to sudden changes in the patient's condition while on positive pressure ventilation such as worsening hypoxia, hypotension and increased peak and plateau pressures on the ventilator.    Pipo Xie PGY-6  Pulmonary/Critical Care Fellow  Pager: [phone] (NACHO) [phone] (NS)  Pulmonary Spectra #[id number] (NS) / [phone] (NACHO) Spoke to SICU team regarding pre-operative risk stratification for R ORIF for right comminuted intertrochanteric fracture. Patient was noted to have small, traumatic right sided pneumothorax on CT chest that cannot be visualized on CXR. With an active pneumothorax, the patient remains at risk for worsening pneumothorax and possibly tension physiology when placed on positive pressure and would benefit from a prophylactic chest tube. However, if the pneumothorax has resolved then there is no need for a prophylactic chest tube and the patient would be able to proceed to surgery, albeit at moderate risk due to level of oxygen requirements. Because the procedure at this time is semi-elective and the pneumothorax cannot be visualized on CXR, I would recommend pursuing one of two options:    1) Repeat a CT chest to assess for resolution of pneumothorax  2) Assess for resolution of pneumothorax using lung ultrasound at bedside, which can be performed by myself and the SICU team tomorrow AM    If however the patient's condition changes and the procedure is deemed emergent overnight and the patient must be taken to the OR, then anesthesia must be aware of the possibility of tension physiology and react quickly to sudden changes in the patient's condition while on positive pressure ventilation such as worsening hypoxia, hypotension and increased peak and plateau pressures on the ventilator. If this situation arises, he should be placed on as minimal PEEP is feasible in order to maintain oxygenation.    Pipo Xie PGY-6  Pulmonary/Critical Care Fellow  Pager: [phone] (NACHO) [phone] (NS)  Pulmonary Spectra #[phone] (NS) / [phone] (NACHO)

## 2021-12-03 NOTE — DISCHARGE NOTE PROVIDER - CARE PROVIDER_API CALL
Osmin Johnson; ESTEBAN; MS)  Neurosurgery  [address], [address]  [place] [address]  Phone: [phone]  Fax: [phone]  Follow Up Time: 1 month    Ezio Powers)  Orthopaedic Surgery  [address], [address]  [place] [address]  Phone: [phone]  Fax: [phone]  Follow Up Time: 2 weeks    Josr Salcedo)  Surgery; Surgical Critical Care  [address] [facility] [address] [facility] [facility] [address], [address]  [place] [address]  Phone: [phone]  Fax: [phone]  Follow Up Time: 2 weeks

## 2021-12-03 NOTE — PROGRESS NOTE ADULT - SUBJECTIVE AND OBJECTIVE BOX
HISTORY:  72 y/o male w/ a PMHx of schizophrenia, HTN, current smoker of 1/2 PPD, and COPD who presented as a transfer from [facility] for a trauma evaluation on 12/1. Patient had a fall at his facility and presented to [facility] initially, where a right IT femur fracture was found. However, he was an RRT overnight for hypoxemia. A CT scan revealed right 8th-10th rib fractures w/ associated hemopneumothorax & pneumomediastinum and a grade 3 liver laceration. He was then transferred to [facility] for further management. Repeat imaging here also revealed a left C3 facet fracture. Patient was admitted to SICU for acute hypoxemic respiratory failure requiring BiPAP. Patient reports right hip pain but otherwise denies headache, weakness, dizziness, fevers, chills, shortness of breath, chest pain, abdominal pain, or nausea/vomiting.    24 HOUR EVENTS:  - Concern for COPD exacerbation so started on Solu-Medrol 20 mg q8hrs, increased Duoneb standing from q6hrs -> q4hrs, and started azithromycin & ceftriaxone  - Weaned from BiPAP to HFNC 50 LPM/90% FiO2  - Blood and sputum cultures sent  - Advanced to regular diet  - Hyponatremic so urine electrolytes sent and cause likely due to HCTZ use  - Started Lovenox for VTE prophylaxis as HCT was stable from 27 -> 286.8 -> 25.7  - Neurosurgery consulted & following for left C3 facet fracture  - Hospitalist consulted & following  - Tertiary exam done  - Changed NS at 100 mL/hr -> D5NS at 75 mL/hr    NEURO  Exam: awake, alert, oriented x3, no acute distress, no acute focal deficits  Meds:  - acetaminophen   IVPB .. 1000 milliGRAM(s) IV Intermittent every 6 hours PRN Mild Pain (1 - 3)  - diVALproex ER 1000 milliGRAM(s) Oral every 24 hours  - HYDROmorphone  Injectable 0.25 milliGRAM(s) IV Push every 4 hours PRN breakthrough pain  - lidocaine   4% Patch 1 Patch Transdermal daily  - OLANZapine 15 milliGRAM(s) Oral at bedtime  - traMADol 25 milliGRAM(s) Oral every 6 hours PRN Moderate Pain (4 - 6)  - traMADol 50 milliGRAM(s) Oral every 6 hours PRN Severe Pain (7 - 10)    RESPIRATORY  RR: 20 (12-03-21 @ 07:00) (19 - 42)  SpO2: 100% (12-03-21 @ 07:00) (85% - 100%)  Exam: mild rhonchi in all lung fields  ABG - ( 03 Dec 2021 00:30 )  pH: 7.42  /  pCO2: 37    /  pO2: 121   / HCO3: 24    / Base Excess: -0.3  /  SaO2: 99.7   /   Lactate: 0.7  Meds:  - albuterol/ipratropium for Nebulization 3 milliLiter(s) Nebulizer every 4 hours  - buDESOnide    Inhalation Suspension 0.5 milliGRAM(s) Inhalation every 12 hours  - methylPREDNISolone sodium succinate Injectable 20 milliGRAM(s) IV Push every 8 hours    CARDIOVASCULAR  HR: 87 (12-03-21 @ 07:00) (78 - 112)  BP: 162/74 (12-03-21 @ 07:00) (115/70 - 162/74)  BP(mean): 107 (12-03-21 @ 07:00) (88 - 107)  Exam: regular rate and rhythm, S1S2  Cardiac Rhythm: sinus  Perfusion    [x]Adequate    [ ]Inadequate  Mentation   [x]Normal       [ ]Reduced  Extremities  [x]Warm         [ ]Cool  Volume Status [ ]Hypervolemic [x]Euvolemic [ ]Hypovolemic    GI/NUTRITION  Exam: soft, nontender, nondistended  Diet: regular  Meds:  - polyethylene glycol 3350 17 Gram(s) Oral daily  - senna 2 Tablet(s) Oral at bedtime    GENITOURINARY  I&O's Detail    12-02 @ 07:01  -  12-03 @ 07:00  --------------------------------------------------------  IN:    dextrose 5% + sodium chloride 0.9%: 975 mL    IV PiggyBack: 500 mL    Oral Fluid: 240 mL    sodium chloride 0.9%: 200 mL  Total IN: 1915 mL    OUT:    Indwelling Catheter - Urethral (mL): 1300 mL  Total OUT: 1300 mL    Total NET: 615 mL    132<L>  |  99  |  29<H>  ----------------------------<  145<H>  4.4   |  21<L>  |  0.83    Ca    8.5      03 Dec 2021 00:26  Phos  3.3  Mg     2.0    Meds: dextrose 5% + sodium chloride 0.9% infuse at 75 mL/hr    HEMATOLOGIC  Meds: enoxaparin Injectable 40 milliGRAM(s) SubCutaneous every 24 hours                        8.7    20.41 )-----------( 338      ( 03 Dec 2021 00:26 )             25.7     PT/INR - ( 03 Dec 2021 00:26 )   PT: 11.6 sec;   INR: 0.97 ratio    PTT - ( 03 Dec 2021 00:26 )  PTT:26.7 sec    INFECTIOUS DISEASES  T(C): 37 (12-03-21 @ 03:00), Max: 37.2 (12-02-21 @ 19:00)  WBC Count:  - 20.41 K/uL (12-03 @ 00:26)  - 19.36 K/uL (12-02 @ 11:46)    Recent Cultures:  - Specimen Source: .Sputum Sputum, 12-02 @ 19:02  Results --  Gram Stain: Numerous polymorphonuclear leukocytes per low power field. Few Squamous epithelial cells per low power field. Moderate Gram Positive Cocci in Pairs and Chains per oil power field. Few Gram Negative Diplococci per oil power field. Rare Gram Negative Rods per oil power field.  Organism: --    Meds:  - azithromycin  IVPB 250 milliGRAM(s) IV Intermittent every 24 hours  - cefTRIAXone   IVPB 1000 milliGRAM(s) IV Intermittent every 24 hours    ENDOCRINE  Capillary Blood Glucose: 145 mg/dL (12-03-21 @ 00:26)    ACCESS DEVICES:  [x] Peripheral IV  [ ] Central Venous Line		[ ] R	[ ] L	[ ] IJ	[ ] Fem	[ ] SC	Placed:   [ ] Arterial Line			[ ] R	[ ] L	[ ] Fem	[ ] Rad	[ ] Ax	Placed:   [ ] PICC:					[ ] Mediport  [x] Urinary Catheter, Date Placed: 12/2  [x] Necessity of urinary, arterial, and venous catheters discussed    OTHER MEDICATIONS:  - chlorhexidine 2% Cloths 1 Application(s) Topical <User Schedule>  - nicotine -  14 mG/24Hr(s) Patch 1 patch Transdermal daily    IMAGING:

## 2021-12-03 NOTE — DIETITIAN INITIAL EVALUATION ADULT. - PERTINENT MEDS FT
MEDICATIONS  (STANDING):  albuterol/ipratropium for Nebulization 3 milliLiter(s) Nebulizer every 4 hours  azithromycin  IVPB      azithromycin  IVPB 250 milliGRAM(s) IV Intermittent every 24 hours  buDESOnide    Inhalation Suspension 0.5 milliGRAM(s) Inhalation every 12 hours  cefTRIAXone   IVPB 1000 milliGRAM(s) IV Intermittent every 24 hours  cefTRIAXone   IVPB      chlorhexidine 2% Cloths 1 Application(s) Topical <User Schedule>  diVALproex ER 1000 milliGRAM(s) Oral every 24 hours  enoxaparin Injectable 40 milliGRAM(s) SubCutaneous every 24 hours  lidocaine   4% Patch 1 Patch Transdermal daily  methylPREDNISolone sodium succinate Injectable 20 milliGRAM(s) IV Push every 8 hours  nicotine -  14 mG/24Hr(s) Patch 1 patch Transdermal daily  OLANZapine 15 milliGRAM(s) Oral at bedtime  polyethylene glycol 3350 17 Gram(s) Oral daily  senna 2 Tablet(s) Oral at bedtime

## 2021-12-03 NOTE — DISCHARGE NOTE PROVIDER - PROVIDER TOKENS
PROVIDER:[TOKEN:[[id number]],FOLLOWUP:[1 month]],PROVIDER:[TOKEN:[[id number]],FOLLOWUP:[2 weeks]],PROVIDER:[TOKEN:[[id number]],FOLLOWUP:[2 weeks]]

## 2021-12-03 NOTE — CONSULT NOTE ADULT - ASSESSMENT
70 y/o M smoker w/recently diagnosed COPD, schizophrenia who was admitted to OSH following trauma where workup was positive for right comminuted intertrochanteric fracture and was awaiting ORIF when patient developed acute hypoxemic respiratory failure now awaiting ORIF. Acute hypoxemic respiratory failure is likely secondary to atelectasis and possible pulmonary contusion secondary to trauma. Patient also with small hemopneumothorax likely due to trauma. No evidence of acute COPD exacerbation on exam, however patient has been receiving treatment so it is possible that he had an exacerbation which has now resolved. Patient currently is very high risk for intraoperative pulmonary complications mainly due to concern for rapid worsening of pneumothorax with positive pressure ventilation. If waiting to proceed with surgery is possible I would recommend waiting and while the pneumothorax is currently small and does not require intervention for treatment, I would recommend a chest tube to be placed prophylactically before taking the patient to surgery to eliminate risk of development of worsening/tension pneumothorax while on positive pressure ventilation. If a chest tube is placed I believe the patient is medically optimized from a pulmonary perspective for planned surgery as he does not appear to be in an acute exacerbation of COPD.    - Wean supplemental O2 as tolerated goal O2 sat >= 90%  - Complete course of steroids  - Bronchodilators  - Surgical management as per surgical teams 72 y/o M smoker w/recently diagnosed COPD, schizophrenia who was admitted to OSH following trauma where workup was positive for right comminuted intertrochanteric fracture and was awaiting ORIF when patient developed acute hypoxemic respiratory failure now awaiting ORIF. Acute hypoxemic respiratory failure is likely secondary to atelectasis and possible pulmonary contusion secondary to trauma. Patient also with small hemopneumothorax likely due to trauma. No evidence of acute COPD exacerbation on exam, however patient has been receiving treatment so it is possible that he had an exacerbation which has now resolved. Patient currently is very high risk for intraoperative pulmonary complications mainly due to concern for rapid worsening of pneumothorax with positive pressure ventilation. If waiting to proceed with surgery is possible I would recommend waiting and while the pneumothorax is currently small and does not require intervention for treatment, I would recommend a chest tube to be placed prophylactically before taking the patient to surgery to eliminate risk of development of worsening/tension pneumothorax while on positive pressure ventilation. If a chest tube is placed I believe the patient is medically optimized from a pulmonary perspective for planned surgery as he does not appear to be in an acute exacerbation of COPD.    - Wean HFNC as tolerated goal O2 sat >= 90%  - Would repeat chest CT prior to OR to ensure resolution of pneumothorax unless chest tube is placed  - Complete course of steroids  - Bronchodilators  - Surgical management as per surgical teams

## 2021-12-03 NOTE — PROGRESS NOTE ADULT - ASSESSMENT
70 y/o male w/ a PMHx of schizophrenia, HTN, current smoker of 1/2 PPD, and COPD presenting s/p fall w/ a left C3 facet fracture, right 8th-10th rib fractures w/ associated hemopneumothorax & pneumomediastinum, and a grade 3 liver laceration with a hospital course c/b acute hypoxemic respiratory failure secondary to likely a COPD exacerbation    PLAN:    Neuro: schizophrenia, left C3 facet fracture  - Assess neurovascular status every 4 hours and cervical collar at all times for left C3 facet fracture  - Multimodal pain control w/ PRN acetaminophen, PRN tramadol, PRN Dilaudid, and lidocaine patch  - Home Depakote and olanzapine for schizophrenia    Resp: COPD, right 8th-10th rib fractures w/ associated hemopneumothorax & pneumomediastinum,  acute hypoxemic respiratory failure likely secondary to COPD exacerbation  - Out of bed to chair and incentive spirometry to prevent atelectasis  - Will wean high flow nasal cannula as tolerated  - Duoneb, Pulmicort, and Solu-Medrol for COPD w/ exacerbation    CV: HTN  - Holding home anti-hypertensives as patient has been normotensive    GI: grade 3 liver laceration  - Regular diet as tolerated  - Monitor LFTs  - Bowel regimen with senna & Miralax    Renal: no acute issues  - D5NS at 75 mL/hr for poor PO intake but can IV lock when PO intake adequate    Heme: acute blood loss anemia  - Lovenox for VTE prophylaxis    ID: no acute issues  - Monitor for clinical evidence of active infection  - Monitor WBC, temperature, and procalcitonin  - Empiric antibiotics    Endo:   - Monitor glucose ; HgbA1C  - for HLD  - for hypothyroidism    Code Status:    Disposition:    Daja Puga PA-C     [id number] 70 y/o male w/ a PMHx of schizophrenia, HTN, current smoker of 1/2 PPD, and COPD presenting s/p fall w/ a left C3 facet fracture, right 8th-10th rib fractures w/ associated hemopneumothorax & pneumomediastinum, and a grade 3 liver laceration with a hospital course c/b acute hypoxemic respiratory failure secondary to likely a COPD exacerbation    PLAN:    Neuro: schizophrenia, left C3 facet fracture  - Assess neurovascular status every 4 hours and cervical collar at all times for left C3 facet fracture  - Multimodal pain control w/ PRN acetaminophen, PRN tramadol, PRN Dilaudid, and lidocaine patch  - Home Depakote and olanzapine for schizophrenia    Resp: COPD, right 8th-10th rib fractures w/ associated hemopneumothorax & pneumomediastinum, acute hypoxemic respiratory failure likely secondary to COPD exacerbation, active smoker  - Out of bed to chair and incentive spirometry to prevent atelectasis  - Will wean high flow nasal cannula as tolerated  - Duoneb, Pulmicort, and Solu-Medrol for COPD w/ exacerbation  - Nicotine patch as patient is active smoker    CV: HTN  - Holding home anti-hypertensives as patient has been normotensive    GI: grade 3 liver laceration  - Regular diet as tolerated  - Monitor LFTs  - Bowel regimen with senna & Miralax    Renal: no acute issues  - D5NS at 75 mL/hr for poor PO intake but can IV lock when PO intake adequate    Heme: acute blood loss anemia  - Lovenox for VTE prophylaxis    ID: COPD exacerbation  - Monitor WBC, temperature, and procalcitonin  - Empiric antibiotics w/ azithromycin & ceftriaxone for COPD exacerbation    Endo: no acute issues  - Monitor glucose on BMP    Code Status: Full code    Disposition: Will remain in SICU    Daaj Puga PA-C     [id number]

## 2021-12-03 NOTE — DISCHARGE NOTE PROVIDER - NSDCCPCAREPLAN_GEN_ALL_CORE_FT
PRINCIPAL DISCHARGE DIAGNOSIS  Diagnosis: Acute respiratory failure with hypoxia  Assessment and Plan of Treatment:       SECONDARY DISCHARGE DIAGNOSES  Diagnosis: Intertrochanteric fracture of right hip  Assessment and Plan of Treatment:     Diagnosis: Essential hypertension  Assessment and Plan of Treatment:     Diagnosis: Liver laceration  Assessment and Plan of Treatment:      PRINCIPAL DISCHARGE DIAGNOSIS  Diagnosis: Acute respiratory failure with hypoxia  Assessment and Plan of Treatment: - c/w standing duonebs spaced to Q6.   - c/w Symbicort  - Please follow up with your PMD within 1 week regarding your hospitalization.  -No acute neurosurgical intervention  -C-collar at all times  -Outpt f/u with Dr. Johnson within 4-6 weeks        SECONDARY DISCHARGE DIAGNOSES  Diagnosis: Intertrochanteric fracture of right hip  Assessment and Plan of Treatment: PT/OT-WBAT RLE  -Follow up with Dr. Powers within 1-2 weeks upon discharge.    Diagnosis: Constipation  Assessment and Plan of Treatment: Please take a stool softener (senna, colace, or miralax) while taking narcotic pain medication to avoid opioid-induced constipation.    Diagnosis: Essential hypertension  Assessment and Plan of Treatment:     Diagnosis: Liver laceration  Assessment and Plan of Treatment: h/h stable.  Please follow up with your Trauma Doctor within 1-2 weeks upon discharge at [facility] [address] [place] [address], [address], phone #[phone].

## 2021-12-03 NOTE — DISCHARGE NOTE PROVIDER - CARE PROVIDERS DIRECT ADDRESSES
,DirectAddress_Unknown,[email or web address],DirectAddress_Unknown

## 2021-12-03 NOTE — DIETITIAN INITIAL EVALUATION ADULT. - REASON FOR ADMISSION
R IT fx    Per chart: "70 y/o male w/ a PMHx of schizophrenia, HTN, current smoker of 1/2 PPD, and COPD presenting s/p fall w/ a left C3 facet fracture, right 8th-10th rib fractures w/ associated hemopneumothorax & pneumomediastinum, and a grade 3 liver laceration with a hospital course c/b acute hypoxemic respiratory failure secondary to likely a COPD exacerbation."

## 2021-12-03 NOTE — DISCHARGE NOTE PROVIDER - NSDCMRMEDTOKEN_GEN_ALL_CORE_FT
amLODIPine 10 mg oral tablet: 1 tab(s) orally once a day  cloNIDine 0.3 mg oral tablet: 0.3 milligram(s) orally 3 times a day  Colace: 200 milligram(s) orally once a day (at bedtime)  Depakote ER: 1000 milligram(s) orally once a day  Dyazide 25 mg-37.5 mg oral capsule: 1 cap(s) orally once a day  enalapril: 40 milligram(s) orally once a day  OLANZapine 15 mg oral tablet: 1 tab(s) orally once a day   acetaminophen 325 mg oral tablet: 3 tab(s) orally every 8 hours  amLODIPine 10 mg oral tablet: 1 tab(s) orally once a day  ascorbic acid 500 mg oral tablet: 1 tab(s) orally once a day  budesonide-formoterol 160 mcg-4.5 mcg/inh inhalation aerosol: 2 puff(s) inhaled 2 times a day  cloNIDine 0.3 mg oral tablet: 0.3 milligram(s) orally 3 times a day  Colace: 200 milligram(s) orally once a day (at bedtime)  Dyazide 25 mg-37.5 mg oral capsule: 1 cap(s) orally once a day  enalapril: 40 milligram(s) orally once a day  ipratropium-albuterol 0.5 mg-2.5 mg/3 mL inhalation solution: 3 milliliter(s) inhaled every 6 hours  lactulose 10 g/15 mL oral syrup: 15 milliliter(s) orally every 6 hours, As Needed  lidocaine 4% topical film: Apply topically to affected area once a day  metoprolol tartrate 25 mg oral tablet: 1 tab(s) orally 2 times a day  nicotine 7 mg/24 hr transdermal film, extended release: 1 patch transdermal once a day  OLANZapine 15 mg oral tablet: 1 tab(s) orally once a day  polyethylene glycol 3350 oral powder for reconstitution: 17 gram(s) orally 2 times a day  senna oral tablet: 2 tab(s) orally once a day (at bedtime)  traMADol 50 mg oral tablet: 0.5 tab(s) orally every 6 hours, As needed, Moderate Pain (4 - 6)   acetaminophen 325 mg oral tablet: 3 tab(s) orally every 8 hours  amLODIPine 10 mg oral tablet: 1 tab(s) orally once a day  ascorbic acid 500 mg oral tablet: 1 tab(s) orally once a day  budesonide-formoterol 160 mcg-4.5 mcg/inh inhalation aerosol: 2 puff(s) inhaled 2 times a day  Colace: 200 milligram(s) orally once a day (at bedtime)  divalproex sodium 500 mg oral tablet, extended release: 2 tab(s) orally every 24 hours  ipratropium-albuterol 0.5 mg-2.5 mg/3 mL inhalation solution: 3 milliliter(s) inhaled every 6 hours, As Needed  lactulose 10 g/15 mL oral syrup: 15 milliliter(s) orally every 6 hours, As Needed  lidocaine 4% topical film: Apply topically to affected area once a day  metoprolol tartrate 25 mg oral tablet: 1 tab(s) orally 2 times a day  nicotine 7 mg/24 hr transdermal film, extended release: 1 patch transdermal once a day  OLANZapine 15 mg oral tablet: 1 tab(s) orally once a day  polyethylene glycol 3350 oral powder for reconstitution: 17 gram(s) orally 2 times a day  senna oral tablet: 2 tab(s) orally once a day (at bedtime)  traMADol 50 mg oral tablet: 0.5 tab(s) orally every 6 hours, As needed, Moderate Pain (4 - 6)   acetaminophen 325 mg oral tablet: 3 tab(s) orally every 8 hours  amLODIPine 10 mg oral tablet: 1 tab(s) orally once a day  ascorbic acid 500 mg oral tablet: 1 tab(s) orally once a day  bisacodyl 10 mg rectal suppository: 1 suppository(ies) rectal once a day, As needed, Constipation  budesonide-formoterol 160 mcg-4.5 mcg/inh inhalation aerosol: 2 puff(s) inhaled 2 times a day  Colace: 200 milligram(s) orally once a day (at bedtime)  divalproex sodium 500 mg oral tablet, extended release: 2 tab(s) orally every 24 hours  ipratropium-albuterol 0.5 mg-2.5 mg/3 mL inhalation solution: 3 milliliter(s) inhaled every 6 hours, As Needed  lactulose 10 g/15 mL oral syrup: 15 milliliter(s) orally every 6 hours, As Needed  lidocaine 4% topical film: Apply topically to affected area once a day  magnesium citrate 1.745 g/30 mL oral liquid: 1 each orally once a day  metoprolol tartrate 25 mg oral tablet: 1 tab(s) orally 2 times a day  nicotine 7 mg/24 hr transdermal film, extended release: 1 patch transdermal once a day  OLANZapine 15 mg oral tablet: 1 tab(s) orally once a day  polyethylene glycol 3350 oral powder for reconstitution: 17 gram(s) orally 2 times a day  senna oral tablet: 2 tab(s) orally once a day (at bedtime)  traMADol 50 mg oral tablet: 0.5 tab(s) orally every 6 hours, As needed, Moderate Pain (4 - 6)

## 2021-12-03 NOTE — CONSULT NOTE ADULT - CONSULT REQUESTED DATE/TIME
03-Dec-2021 14:31
01-Dec-2021 21:38
02-Dec-2021 10:07
03-Dec-2021 14:41
01-Dec-2021 18:59
02-Dec-2021 14:26

## 2021-12-04 LAB
ANION GAP SERPL CALC-SCNC: 12 MMOL/L — SIGNIFICANT CHANGE UP (ref 5–17)
APTT BLD: 26.4 SEC — LOW (ref 27.5–35.5)
BLD GP AB SCN SERPL QL: NEGATIVE — SIGNIFICANT CHANGE UP
BUN SERPL-MCNC: 34 MG/DL — HIGH (ref 7–23)
CALCIUM SERPL-MCNC: 9.3 MG/DL — SIGNIFICANT CHANGE UP (ref 8.4–10.5)
CHLORIDE SERPL-SCNC: 99 MMOL/L — SIGNIFICANT CHANGE UP (ref 96–108)
CO2 SERPL-SCNC: 21 MMOL/L — LOW (ref 22–31)
CREAT SERPL-MCNC: 0.96 MG/DL — SIGNIFICANT CHANGE UP (ref 0.5–1.3)
GAS PNL BLDA: SIGNIFICANT CHANGE UP
GAS PNL BLDV: SIGNIFICANT CHANGE UP
GLUCOSE SERPL-MCNC: 135 MG/DL — HIGH (ref 70–99)
HCT VFR BLD CALC: 27 % — LOW (ref 39–50)
HCT VFR BLD CALC: 27.6 % — LOW (ref 39–50)
HGB BLD-MCNC: 9 G/DL — LOW (ref 13–17)
HGB BLD-MCNC: 9.2 G/DL — LOW (ref 13–17)
INR BLD: 0.94 RATIO — SIGNIFICANT CHANGE UP (ref 0.88–1.16)
MAGNESIUM SERPL-MCNC: 2.1 MG/DL — SIGNIFICANT CHANGE UP (ref 1.6–2.6)
MCHC RBC-ENTMCNC: 28.5 PG — SIGNIFICANT CHANGE UP (ref 27–34)
MCHC RBC-ENTMCNC: 28.6 PG — SIGNIFICANT CHANGE UP (ref 27–34)
MCHC RBC-ENTMCNC: 33.3 GM/DL — SIGNIFICANT CHANGE UP (ref 32–36)
MCHC RBC-ENTMCNC: 33.3 GM/DL — SIGNIFICANT CHANGE UP (ref 32–36)
MCV RBC AUTO: 85.4 FL — SIGNIFICANT CHANGE UP (ref 80–100)
MCV RBC AUTO: 85.7 FL — SIGNIFICANT CHANGE UP (ref 80–100)
NRBC # BLD: 0 /100 WBCS — SIGNIFICANT CHANGE UP (ref 0–0)
NRBC # BLD: 0 /100 WBCS — SIGNIFICANT CHANGE UP (ref 0–0)
PHOSPHATE SERPL-MCNC: 3.2 MG/DL — SIGNIFICANT CHANGE UP (ref 2.5–4.5)
PLATELET # BLD AUTO: 408 K/UL — HIGH (ref 150–400)
PLATELET # BLD AUTO: 448 K/UL — HIGH (ref 150–400)
POTASSIUM SERPL-MCNC: 4.4 MMOL/L — SIGNIFICANT CHANGE UP (ref 3.5–5.3)
POTASSIUM SERPL-SCNC: 4.4 MMOL/L — SIGNIFICANT CHANGE UP (ref 3.5–5.3)
PROTHROM AB SERPL-ACNC: 11.3 SEC — SIGNIFICANT CHANGE UP (ref 10.6–13.6)
RBC # BLD: 3.16 M/UL — LOW (ref 4.2–5.8)
RBC # BLD: 3.22 M/UL — LOW (ref 4.2–5.8)
RBC # FLD: 16 % — HIGH (ref 10.3–14.5)
RBC # FLD: 16 % — HIGH (ref 10.3–14.5)
RH IG SCN BLD-IMP: POSITIVE — SIGNIFICANT CHANGE UP
SARS-COV-2 RNA SPEC QL NAA+PROBE: SIGNIFICANT CHANGE UP
SODIUM SERPL-SCNC: 132 MMOL/L — LOW (ref 135–145)
WBC # BLD: 24.83 K/UL — HIGH (ref 3.8–10.5)
WBC # BLD: 29.06 K/UL — HIGH (ref 3.8–10.5)
WBC # FLD AUTO: 24.83 K/UL — HIGH (ref 3.8–10.5)
WBC # FLD AUTO: 29.06 K/UL — HIGH (ref 3.8–10.5)

## 2021-12-04 PROCEDURE — 27245 TREAT THIGH FRACTURE: CPT | Mod: RT

## 2021-12-04 PROCEDURE — 71045 X-RAY EXAM CHEST 1 VIEW: CPT | Mod: 26,76

## 2021-12-04 PROCEDURE — 99233 SBSQ HOSP IP/OBS HIGH 50: CPT

## 2021-12-04 RX ORDER — AZITHROMYCIN 500 MG/1
250 TABLET, FILM COATED ORAL ONCE
Refills: 0 | Status: DISCONTINUED | OUTPATIENT
Start: 2021-12-05 | End: 2021-12-05

## 2021-12-04 RX ORDER — AZITHROMYCIN 500 MG/1
250 TABLET, FILM COATED ORAL ONCE
Refills: 0 | Status: COMPLETED | OUTPATIENT
Start: 2021-12-04 | End: 2021-12-04

## 2021-12-04 RX ORDER — METOPROLOL TARTRATE 50 MG
25 TABLET ORAL
Refills: 0 | Status: DISCONTINUED | OUTPATIENT
Start: 2021-12-04 | End: 2021-12-09

## 2021-12-04 RX ORDER — CEFTRIAXONE 500 MG/1
1000 INJECTION, POWDER, FOR SOLUTION INTRAMUSCULAR; INTRAVENOUS ONCE
Refills: 0 | Status: COMPLETED | OUTPATIENT
Start: 2021-12-04 | End: 2021-12-04

## 2021-12-04 RX ORDER — CEFTRIAXONE 500 MG/1
1000 INJECTION, POWDER, FOR SOLUTION INTRAMUSCULAR; INTRAVENOUS EVERY 24 HOURS
Refills: 0 | Status: DISCONTINUED | OUTPATIENT
Start: 2021-12-04 | End: 2021-12-05

## 2021-12-04 RX ORDER — SODIUM CHLORIDE 9 MG/ML
500 INJECTION INTRAMUSCULAR; INTRAVENOUS; SUBCUTANEOUS ONCE
Refills: 0 | Status: COMPLETED | OUTPATIENT
Start: 2021-12-04 | End: 2021-12-04

## 2021-12-04 RX ORDER — AZITHROMYCIN 500 MG/1
250 TABLET, FILM COATED ORAL EVERY 24 HOURS
Refills: 0 | Status: COMPLETED | OUTPATIENT
Start: 2021-12-05 | End: 2021-12-07

## 2021-12-04 RX ADMIN — CEFTRIAXONE 100 MILLIGRAM(S): 500 INJECTION, POWDER, FOR SOLUTION INTRAMUSCULAR; INTRAVENOUS at 22:08

## 2021-12-04 RX ADMIN — Medication 1 PATCH: at 07:33

## 2021-12-04 RX ADMIN — Medication 25 MILLIGRAM(S): at 17:11

## 2021-12-04 RX ADMIN — Medication 3 MILLILITER(S): at 01:21

## 2021-12-04 RX ADMIN — CEFTRIAXONE 100 MILLIGRAM(S): 500 INJECTION, POWDER, FOR SOLUTION INTRAMUSCULAR; INTRAVENOUS at 14:27

## 2021-12-04 RX ADMIN — Medication 20 MILLIGRAM(S): at 09:15

## 2021-12-04 RX ADMIN — LIDOCAINE 1 PATCH: 4 CREAM TOPICAL at 09:12

## 2021-12-04 RX ADMIN — TRAMADOL HYDROCHLORIDE 50 MILLIGRAM(S): 50 TABLET ORAL at 03:45

## 2021-12-04 RX ADMIN — Medication 0.5 MILLIGRAM(S): at 18:24

## 2021-12-04 RX ADMIN — Medication 400 MILLIGRAM(S): at 04:45

## 2021-12-04 RX ADMIN — Medication 3 MILLILITER(S): at 06:10

## 2021-12-04 RX ADMIN — Medication 1000 MILLIGRAM(S): at 05:00

## 2021-12-04 RX ADMIN — Medication 3 MILLILITER(S): at 14:31

## 2021-12-04 RX ADMIN — TRAMADOL HYDROCHLORIDE 50 MILLIGRAM(S): 50 TABLET ORAL at 05:45

## 2021-12-04 RX ADMIN — SENNA PLUS 2 TABLET(S): 8.6 TABLET ORAL at 22:07

## 2021-12-04 RX ADMIN — CHLORHEXIDINE GLUCONATE 1 APPLICATION(S): 213 SOLUTION TOPICAL at 05:18

## 2021-12-04 RX ADMIN — Medication 3 MILLILITER(S): at 22:59

## 2021-12-04 RX ADMIN — SODIUM CHLORIDE 75 MILLILITER(S): 9 INJECTION, SOLUTION INTRAVENOUS at 09:15

## 2021-12-04 RX ADMIN — Medication 3 MILLILITER(S): at 09:20

## 2021-12-04 RX ADMIN — SODIUM CHLORIDE 75 MILLILITER(S): 9 INJECTION, SOLUTION INTRAVENOUS at 00:01

## 2021-12-04 RX ADMIN — LIDOCAINE 1 PATCH: 4 CREAM TOPICAL at 07:33

## 2021-12-04 RX ADMIN — LIDOCAINE 1 PATCH: 4 CREAM TOPICAL at 22:25

## 2021-12-04 RX ADMIN — Medication 20 MILLIGRAM(S): at 01:27

## 2021-12-04 RX ADMIN — Medication 1 PATCH: at 19:44

## 2021-12-04 RX ADMIN — DIVALPROEX SODIUM 1000 MILLIGRAM(S): 500 TABLET, DELAYED RELEASE ORAL at 22:08

## 2021-12-04 RX ADMIN — Medication 1 PATCH: at 22:09

## 2021-12-04 RX ADMIN — AZITHROMYCIN 250 MILLIGRAM(S): 500 TABLET, FILM COATED ORAL at 15:21

## 2021-12-04 RX ADMIN — OLANZAPINE 15 MILLIGRAM(S): 15 TABLET, FILM COATED ORAL at 22:26

## 2021-12-04 RX ADMIN — Medication 20 MILLIGRAM(S): at 17:10

## 2021-12-04 RX ADMIN — SODIUM CHLORIDE 75 MILLILITER(S): 9 INJECTION, SOLUTION INTRAVENOUS at 15:20

## 2021-12-04 RX ADMIN — SODIUM CHLORIDE 500 MILLILITER(S): 9 INJECTION INTRAMUSCULAR; INTRAVENOUS; SUBCUTANEOUS at 15:20

## 2021-12-04 RX ADMIN — Medication 0.5 MILLIGRAM(S): at 06:08

## 2021-12-04 RX ADMIN — Medication 1 PATCH: at 22:25

## 2021-12-04 RX ADMIN — Medication 3 MILLILITER(S): at 18:24

## 2021-12-04 NOTE — PROGRESS NOTE ADULT - ASSESSMENT
72 y/o male w/ a PMHx of schizophrenia, HTN, current smoker of 1/2 PPD, and COPD presenting s/p fall w/ a left C3 facet fracture, right 8th-10th rib fractures w/ associated hemopneumothorax & pneumomediastinum, and a grade 3 liver laceration with a hospital course c/b acute hypoxemic respiratory failure secondary to likely a COPD exacerbation    Neuro: hx of schizophrenia, CTH neg, CT c-spine: non-displaced L C3-4 facet fx with C3 laminar extension  -f/u CTA neck (official read) to r/o cerebrovascular injury  -NSx spine: c-collar, f/u outpt  -home olanzaprine, Depakote  -acute pain control for rib fx: tylenol, tramadol and Dilaudid PRN, lido patch  -monitor mental status    Resp: right rib fx, COPD exacerbation with hypoxic resp failure, had recent CTA no PE  -f/u CT chest (official read) for PTX resolution  -HFNC 55%/40, wean as tolerated  -noctural BiPAP at 14/7/40% for atelectasis/hypoxia  -hydrocortisone 20 q8h IVP + azithro & CTX started for COPD exacerbation  -CXR daily  -Pulmicort and Duonebs q4hr  -nicotine patch QD  -chest PT, IS  -HOB >35 degrees    CVS: hx of HTN, BPs borderline on admission, lactate cleared  -monitor hemodynamics  -holding home amlodipine and enalapril    GI:   -diet: soft and bite-sized --> NPOpMN for OR  -bowel regimen: senna, Miralax    /Renal: hyponatremia, urine lytes & serum osmoles WNL  -D5NS @ 75 while NPO  -Uro: urine cytology (ord), rest of w/u outpt  -Ybarra  -monitor renal function and electrolytes    Heme: grade 3 liver lac, H&H stable  -q12h CBC  -DVT ppx: Lovenox    ID: UA neg, sputum cx: mod G+ cocci in pairs/chains, few G- diplococci, rare G- rods  -azithro, CTX  -f/u blood cx x2 (NGTD 12/3)  -monitor temp and WBC    Endo:   -monitor glucose    MSK: R IT fx, deep tissue injury over B/L sacrum with small amount of skin breakdown noted  -OR 12/4 with ortho pending pulm clearance (f/u repeat CT chest official read)  -NWB RLE, bedrest  -Advanced Cavilon applied to buttocks M-W-F; R trochanter: apply Cavilon daily

## 2021-12-04 NOTE — PROGRESS NOTE ADULT - SUBJECTIVE AND OBJECTIVE BOX
HISTORY:  72 y/o male w/ a PMHx of schizophrenia, HTN, current smoker of 1/2 PPD, and COPD who presented as a transfer from [facility] for a trauma evaluation on 12/1. Patient had a fall at his facility and presented to [facility] initially, where a right IT femur fracture was found. However, he was an RRT overnight for hypoxemia. A CT scan revealed right 8th-10th rib fractures w/ associated hemopneumothorax & pneumomediastinum and a grade 3 liver laceration. He was then transferred to [facility] for further management. Repeat imaging here also revealed a left C3 facet fracture. Patient was admitted to SICU for acute hypoxemic respiratory failure requiring BiPAP. Patient reports right hip pain but otherwise denies headache, weakness, dizziness, fevers, chills, shortness of breath, chest pain, abdominal pain, or nausea/vomiting.    24 HOUR EVENTS:  -OR 12/4 with ortho pending pulm clearance (f/u repeat CT chest official read)  -Uro: urine cytology (ord), rest of w/u outpt    NEURO  Exam: awake, alert, oriented x3, no acute distress, no acute focal deficits  Meds:  - acetaminophen   IVPB .. 1000 milliGRAM(s) IV Intermittent every 6 hours PRN Mild Pain (1 - 3)  - diVALproex ER 1000 milliGRAM(s) Oral every 24 hours  - HYDROmorphone  Injectable 0.25 milliGRAM(s) IV Push every 4 hours PRN breakthrough pain  - lidocaine   4% Patch 1 Patch Transdermal daily  - OLANZapine 15 milliGRAM(s) Oral at bedtime  - traMADol 25 milliGRAM(s) Oral every 6 hours PRN Moderate Pain (4 - 6)  - traMADol 50 milliGRAM(s) Oral every 6 hours PRN Severe Pain (7 - 10)    RESPIRATORY  RR: 25 (03 Dec 2021 23:00) (19 - 42)  SpO2: 91% (03 Dec 2021 23:00) (88% - 100%)  Exam: mild rhonchi in all lung fields  ABG - ( 03 Dec 2021 00:30 )  pH: 7.42  /  pCO2: 37    /  pO2: 121   / HCO3: 24    / Base Excess: -0.3  /  SaO2: 99.7   /   Lactate: 0.7  Meds:  - albuterol/ipratropium for Nebulization 3 milliLiter(s) Nebulizer every 4 hours  - buDESOnide    Inhalation Suspension 0.5 milliGRAM(s) Inhalation every 12 hours  - methylPREDNISolone sodium succinate Injectable 20 milliGRAM(s) IV Push every 8 hours    CARDIOVASCULAR  HR: 97 (03 Dec 2021 23:00) (78 - 107)  BP: 155/75 (03 Dec 2021 23:00) (140/70 - 162/82)  BP(mean): 108 (03 Dec 2021 23:00) (97 - 115)  Exam: regular rate and rhythm, S1S2  Cardiac Rhythm: sinus  Perfusion    [x]Adequate    [ ]Inadequate  Mentation   [x]Normal       [ ]Reduced  Extremities  [x]Warm         [ ]Cool  Volume Status [ ]Hypervolemic [x]Euvolemic [ ]Hypovolemic    GI/NUTRITION  Exam: soft, nontender, nondistended  Diet: regular  Meds:  - polyethylene glycol 3350 17 Gram(s) Oral daily  - senna 2 Tablet(s) Oral at bedtime    GENITOURINARY  I&O's Summary    02 Dec 2021 07:01  -  03 Dec 2021 07:00  --------------------------------------------------------  IN: 1915 mL / OUT: 1300 mL / NET: 615 mL    03 Dec 2021 07:01  -  04 Dec 2021 00:23  --------------------------------------------------------  IN: 1825 mL / OUT: 865 mL / NET: 960 mL    Meds: dextrose 5% + sodium chloride 0.9% infuse at 75 mL/hr    HEMATOLOGIC  Meds: enoxaparin Injectable 40 milliGRAM(s) SubCutaneous every 24 hours                        8.7    20.41 )-----------( 338      ( 03 Dec 2021 00:26 )             25.7     PT/INR - ( 03 Dec 2021 00:26 )   PT: 11.6 sec;   INR: 0.97 ratio    PTT - ( 03 Dec 2021 00:26 )  PTT:26.7 sec    INFECTIOUS DISEASES  T(C): 36.8 (03 Dec 2021 23:00), Max: 37.1 (03 Dec 2021 11:00)  WBC Count:  - 20.41 K/uL (12-03 @ 00:26)  - 19.36 K/uL (12-02 @ 11:46)    Recent Cultures:  - Specimen Source: .Sputum Sputum, 12-02 @ 19:02  Results --  Gram Stain: Numerous polymorphonuclear leukocytes per low power field. Few Squamous epithelial cells per low power field. Moderate Gram Positive Cocci in Pairs and Chains per oil power field. Few Gram Negative Diplococci per oil power field. Rare Gram Negative Rods per oil power field.  Organism: --    Meds:  - azithromycin  IVPB 250 milliGRAM(s) IV Intermittent every 24 hours  - cefTRIAXone   IVPB 1000 milliGRAM(s) IV Intermittent every 24 hours    ENDOCRINE  Capillary Blood Glucose: 145 mg/dL (12-03-21 @ 00:26)    ACCESS DEVICES:  [x] Peripheral IV  [ ] Central Venous Line		[ ] R	[ ] L	[ ] IJ	[ ] Fem	[ ] SC	Placed:   [ ] Arterial Line			[ ] R	[ ] L	[ ] Fem	[ ] Rad	[ ] Ax	Placed:   [ ] PICC:					[ ] Mediport  [x] Urinary Catheter, Date Placed: 12/2  [x] Necessity of urinary, arterial, and venous catheters discussed    OTHER MEDICATIONS:  - chlorhexidine 2% Cloths 1 Application(s) Topical <User Schedule>  - nicotine -  14 mG/24Hr(s) Patch 1 patch Transdermal daily    IMAGING:  < from: CT Chest No Cont (12.03.21 @ 22:23) >  ******PRELIMINARY REPORT******    ******PRELIMINARY REPORT******          EXAM:  CT CHEST                          PROCEDURE DATE:  12/03/2021         INTERPRETATION:  decreased/resolved right apical ptx, otherwise stable. no emergent findings.    follow up official report.

## 2021-12-04 NOTE — PROGRESS NOTE ADULT - SUBJECTIVE AND OBJECTIVE BOX
No acute events overnight. Pain controlled. Endorses mild dyspnea, on nocturnal BiPAP. Denies fevers/chills or chest pain.    ICU Vital Signs Last 24 Hrs  T(C): 36.8 (03 Dec 2021 23:00), Max: 37.1 (03 Dec 2021 11:00)  T(F): 98.2 (03 Dec 2021 23:00), Max: 98.8 (03 Dec 2021 11:00)  HR: 97 (03 Dec 2021 23:00) (78 - 107)  BP: 155/75 (03 Dec 2021 23:00) (140/70 - 162/82)  BP(mean): 108 (03 Dec 2021 23:00) (97 - 115)  RR: 25 (03 Dec 2021 23:00) (19 - 42)  SpO2: 91% (03 Dec 2021 23:00) (88% - 100%)      PE  Gen: Laying in bed, alert and oriented, NAD, C collar in place  Resp: on nocturnal BiPAP  RLE:  SILT DP/SP/ Adolfo/Saph/Post Tib  +EHL/FHL/TA/Gastroc,   DP+   Soft compartments, no calf ttp                 71yMale with R IT fx  - strict bedrest prior to OR  - Pain control  - IS  - Continue home medications  - Regular Diet, NPOpMN/IVF  - CBC/BMP/Coags/UA/T+S x2  - EKG/CXR  - Pending pulm clearance prior to planned procedure  - Plan for OR 12/4 for R femoral IMN

## 2021-12-04 NOTE — PRE-ANESTHESIA EVALUATION ADULT - NSANTHPMHFT_GEN_ALL_CORE
72 y/o M smoker w/recently diagnosed COPD, schizophrenia who was admitted to OSH following trauma where workup was positive for right comminuted intertrochanteric fracture and was awaiting ORIF when patient developed acute hypoxemic respiratory failure, CTs were done showing multiple rib fractures, cervical fractures, small hemopneumothorax, pneumomediastinum and grade 3 liver laceration - repeat chest x ray showing resolved pneumothorax

## 2021-12-04 NOTE — PROGRESS NOTE ADULT - ASSESSMENT
72 y/o male w/ a PMHx of schizophrenia, HTN, current smoker of 1/2 PPD, and COPD presenting s/p fall w/ a left C3 facet fracture, right 8th-10th rib fractures w/ associated hemopneumothorax & pneumomediastinum, and a grade 3 liver laceration with a hospital course c/b acute hypoxemic respiratory failure likely 2/2 a COPD exacerbation.    - OOBAT, IS  - wean off HFNC  - f/u AM labs, monitor H&H  - multimodal pain control PRN  - pending medical clearance for hip IMN with ortho today       ACS  [phone]

## 2021-12-04 NOTE — PRE-ANESTHESIA EVALUATION ADULT - NSANTHOSAYNRD_GEN_A_CORE
No. RICHAR screening performed.  STOP BANG Legend: 0-2 = LOW Risk; 3-4 = INTERMEDIATE Risk; 5-8 = HIGH Risk

## 2021-12-04 NOTE — CHART NOTE - NSCHARTNOTEFT_GEN_A_CORE
Repeat chest CT on 12/3 shows interval resolution of previously seen small right apical pneumothorax. Patient can proceed with planned surgery without further intervention, albeit at moderate risk for perioperative pulmonary complications due to level of oxygen requirements.    Pipo Xie PGY-6  Pulmonary/Critical Care Fellow  Pager: [phone] (NACHO) [phone] (NS)  MICU Spectra #[phone] (NS)

## 2021-12-04 NOTE — PRE-ANESTHESIA EVALUATION ADULT - NSANTHADDINFOFT_GEN_ALL_CORE
Risks and indications for general anesthesia were discussed including but not limited to nausea, throat soreness/dental injury, anaphylaxis, myocardial infarction and stroke. These risks were acknowledged and accepted by patient, all of their questions were answered as well.

## 2021-12-04 NOTE — CHART NOTE - NSCHARTNOTESELECT_GEN_ALL_CORE
Pre-op risk stratification/Event Note
Neurosurgery/Event Note
Pre-op risk stratification/Event Note
Tertiary Survey

## 2021-12-04 NOTE — PROGRESS NOTE ADULT - SUBJECTIVE AND OBJECTIVE BOX
SURGERY PROGRESS NOTE  Hospital Day #12-01-21 (4d)  Procedure/Dx: Placement of trochanteric nail into right hip    Vital Signs Last 24 Hrs  T(C): 37.3 (05 Dec 2021 03:00), Max: 37.6 (04 Dec 2021 19:00)  T(F): 99.1 (05 Dec 2021 03:00), Max: 99.7 (04 Dec 2021 19:00)  HR: 63 (05 Dec 2021 05:00) (60 - 103)  BP: 171/79 (05 Dec 2021 05:00) (141/76 - 183/79)  BP(mean): 114 (05 Dec 2021 05:00) (101 - 119)  RR: 15 (05 Dec 2021 05:00) (11 - 26)  SpO2: 97% (05 Dec 2021 05:00) (90% - 100%)    PHYSICAL EXAM   General:  AAOx3 in NAD  Neck:  Supple, FOM, no palpable masses  HEENT:  Normocephalic, atraumatic, nonicteric sclera, MMM   Chest:  Equal expansion bilaterally, equal breath sounds  Abdomen:  Soft, nondistended, nontender to palpation in all four quadrants     I's & O's  12-03-21 @ 07:01  -  12-04-21 @ 07:00  --------------------------------------------------------  Total NET: 1375 mL  12-04-21 @ 07:01  -  12-05-21 @ 05:29  --------------------------------------------------------  Total NET: 1740 mL    MEDICATIONS:  ANTIBIOTICS: azithromycin  IVPB 250 milliGRAM(s)  cefTRIAXone   IVPB 1000 milliGRAM(s)    LAB/STUDIES:             8.2    26.86 )-----------( 406      ( 04 Dec 2021 23:36 )             25.0   133<L>  |  101  |  44<H>  ----------------------------<  132<H>  5.0   |  21<L>  |  1.15  Ca    9.0      04 Dec 2021 23:36  Phos  4.4     12-04  Mg     2.2     12-04  PT/INR - ( 04 Dec 2021 01:31 )   PT: 11.3 sec;   INR: 0.94 ratio    PTT - ( 04 Dec 2021 01:31 )  PTT:26.4 sec  ABG - ( 04 Dec 2021 23:29 )  pH, Arterial: 7.40  pH, Blood: x     /  pCO2: 38    /  pO2: 101   / HCO3: 24    / Base Excess: -1.1  /  SaO2: 99.0    Culture - Sputum (collected 02 Dec 2021 19:02)  Source: .Sputum Sputum  Gram Stain (02 Dec 2021 22:46):    Numerous polymorphonuclear leukocytes per low power field    Few Squamous epithelial cells per low power field    Moderate Gram Positive Cocci in Pairs and Chains per oil power field    Few Gram Negative Diplococci per oil power field    Rare Gram NegativeRods per oil power field  Preliminary Report (04 Dec 2021 19:30):    Moderate Moraxella catarrhalis "Susceptibilities not performed"    Moderate Haemophilus influenzae "Susceptibilities not performed"    Moderate Streptococcus pneumoniae  Culture - Blood (collected 02 Dec 2021 19:00)  Source: .Blood Blood-Venous  Preliminary Report (03 Dec 2021 19:02):    No growth to date.  Culture - Blood (collected 02 Dec 2021 19:00)  Source: .Blood Blood-Venous  Preliminary Report (03 Dec 2021 19:02):    No growth to date.

## 2021-12-05 LAB
-  CEFTRIAXONE: SIGNIFICANT CHANGE UP
-  CLINDAMYCIN: SIGNIFICANT CHANGE UP
-  ERYTHROMYCIN: SIGNIFICANT CHANGE UP
-  LEVOFLOXACIN: SIGNIFICANT CHANGE UP
-  PENICILLIN: SIGNIFICANT CHANGE UP
-  PENICILLIN: SIGNIFICANT CHANGE UP
-  TRIMETHOPRIM/SULFAMETHOXAZOLE: SIGNIFICANT CHANGE UP
-  VANCOMYCIN: SIGNIFICANT CHANGE UP
ANION GAP SERPL CALC-SCNC: 11 MMOL/L — SIGNIFICANT CHANGE UP (ref 5–17)
ANION GAP SERPL CALC-SCNC: 11 MMOL/L — SIGNIFICANT CHANGE UP (ref 5–17)
APTT BLD: 18 SEC — LOW (ref 27.5–35.5)
BUN SERPL-MCNC: 39 MG/DL — HIGH (ref 7–23)
BUN SERPL-MCNC: 44 MG/DL — HIGH (ref 7–23)
CALCIUM SERPL-MCNC: 9 MG/DL — SIGNIFICANT CHANGE UP (ref 8.4–10.5)
CALCIUM SERPL-MCNC: 9.1 MG/DL — SIGNIFICANT CHANGE UP (ref 8.4–10.5)
CHLORIDE SERPL-SCNC: 100 MMOL/L — SIGNIFICANT CHANGE UP (ref 96–108)
CHLORIDE SERPL-SCNC: 101 MMOL/L — SIGNIFICANT CHANGE UP (ref 96–108)
CO2 SERPL-SCNC: 21 MMOL/L — LOW (ref 22–31)
CO2 SERPL-SCNC: 21 MMOL/L — LOW (ref 22–31)
CREAT SERPL-MCNC: 0.85 MG/DL — SIGNIFICANT CHANGE UP (ref 0.5–1.3)
CREAT SERPL-MCNC: 1.15 MG/DL — SIGNIFICANT CHANGE UP (ref 0.5–1.3)
CULTURE RESULTS: SIGNIFICANT CHANGE UP
GAS PNL BLDA: SIGNIFICANT CHANGE UP
GLUCOSE SERPL-MCNC: 117 MG/DL — HIGH (ref 70–99)
GLUCOSE SERPL-MCNC: 132 MG/DL — HIGH (ref 70–99)
HCT VFR BLD CALC: 25 % — LOW (ref 39–50)
HCT VFR BLD CALC: 25.5 % — LOW (ref 39–50)
HGB BLD-MCNC: 8.2 G/DL — LOW (ref 13–17)
HGB BLD-MCNC: 8.5 G/DL — LOW (ref 13–17)
INR BLD: 0.98 RATIO — SIGNIFICANT CHANGE UP (ref 0.88–1.16)
LEGIONELLA AG UR QL: NEGATIVE — SIGNIFICANT CHANGE UP
MAGNESIUM SERPL-MCNC: 2 MG/DL — SIGNIFICANT CHANGE UP (ref 1.6–2.6)
MAGNESIUM SERPL-MCNC: 2.2 MG/DL — SIGNIFICANT CHANGE UP (ref 1.6–2.6)
MCHC RBC-ENTMCNC: 28.1 PG — SIGNIFICANT CHANGE UP (ref 27–34)
MCHC RBC-ENTMCNC: 28.3 PG — SIGNIFICANT CHANGE UP (ref 27–34)
MCHC RBC-ENTMCNC: 32.8 GM/DL — SIGNIFICANT CHANGE UP (ref 32–36)
MCHC RBC-ENTMCNC: 33.3 GM/DL — SIGNIFICANT CHANGE UP (ref 32–36)
MCV RBC AUTO: 84.2 FL — SIGNIFICANT CHANGE UP (ref 80–100)
MCV RBC AUTO: 86.2 FL — SIGNIFICANT CHANGE UP (ref 80–100)
METHOD TYPE: SIGNIFICANT CHANGE UP
METHOD TYPE: SIGNIFICANT CHANGE UP
NRBC # BLD: 0 /100 WBCS — SIGNIFICANT CHANGE UP (ref 0–0)
NRBC # BLD: 0 /100 WBCS — SIGNIFICANT CHANGE UP (ref 0–0)
ORGANISM # SPEC MICROSCOPIC CNT: SIGNIFICANT CHANGE UP
PHOSPHATE SERPL-MCNC: 3 MG/DL — SIGNIFICANT CHANGE UP (ref 2.5–4.5)
PHOSPHATE SERPL-MCNC: 4.4 MG/DL — SIGNIFICANT CHANGE UP (ref 2.5–4.5)
PLATELET # BLD AUTO: 406 K/UL — HIGH (ref 150–400)
PLATELET # BLD AUTO: 417 K/UL — HIGH (ref 150–400)
POTASSIUM SERPL-MCNC: 5 MMOL/L — SIGNIFICANT CHANGE UP (ref 3.5–5.3)
POTASSIUM SERPL-MCNC: 5.1 MMOL/L — SIGNIFICANT CHANGE UP (ref 3.5–5.3)
POTASSIUM SERPL-SCNC: 5 MMOL/L — SIGNIFICANT CHANGE UP (ref 3.5–5.3)
POTASSIUM SERPL-SCNC: 5.1 MMOL/L — SIGNIFICANT CHANGE UP (ref 3.5–5.3)
PROTHROM AB SERPL-ACNC: 11.8 SEC — SIGNIFICANT CHANGE UP (ref 10.6–13.6)
RBC # BLD: 2.9 M/UL — LOW (ref 4.2–5.8)
RBC # BLD: 3.03 M/UL — LOW (ref 4.2–5.8)
RBC # FLD: 16 % — HIGH (ref 10.3–14.5)
RBC # FLD: 16.1 % — HIGH (ref 10.3–14.5)
SODIUM SERPL-SCNC: 132 MMOL/L — LOW (ref 135–145)
SODIUM SERPL-SCNC: 133 MMOL/L — LOW (ref 135–145)
SPECIMEN SOURCE: SIGNIFICANT CHANGE UP
WBC # BLD: 26.86 K/UL — HIGH (ref 3.8–10.5)
WBC # BLD: 34.85 K/UL — HIGH (ref 3.8–10.5)
WBC # FLD AUTO: 26.86 K/UL — HIGH (ref 3.8–10.5)
WBC # FLD AUTO: 34.85 K/UL — HIGH (ref 3.8–10.5)

## 2021-12-05 PROCEDURE — 71045 X-RAY EXAM CHEST 1 VIEW: CPT | Mod: 26

## 2021-12-05 PROCEDURE — 99232 SBSQ HOSP IP/OBS MODERATE 35: CPT

## 2021-12-05 RX ORDER — CEFTRIAXONE 500 MG/1
1000 INJECTION, POWDER, FOR SOLUTION INTRAMUSCULAR; INTRAVENOUS EVERY 24 HOURS
Refills: 0 | Status: COMPLETED | OUTPATIENT
Start: 2021-12-05 | End: 2021-12-05

## 2021-12-05 RX ORDER — ENOXAPARIN SODIUM 100 MG/ML
40 INJECTION SUBCUTANEOUS EVERY 24 HOURS
Refills: 0 | Status: DISCONTINUED | OUTPATIENT
Start: 2021-12-05 | End: 2021-12-09

## 2021-12-05 RX ORDER — AMLODIPINE BESYLATE 2.5 MG/1
5 TABLET ORAL DAILY
Refills: 0 | Status: DISCONTINUED | OUTPATIENT
Start: 2021-12-05 | End: 2021-12-08

## 2021-12-05 RX ORDER — HYDROCORTISONE 20 MG
20 TABLET ORAL DAILY
Refills: 0 | Status: COMPLETED | OUTPATIENT
Start: 2021-12-06 | End: 2021-12-06

## 2021-12-05 RX ADMIN — Medication 3 MILLILITER(S): at 14:09

## 2021-12-05 RX ADMIN — Medication 3 MILLILITER(S): at 17:06

## 2021-12-05 RX ADMIN — CHLORHEXIDINE GLUCONATE 1 APPLICATION(S): 213 SOLUTION TOPICAL at 05:49

## 2021-12-05 RX ADMIN — Medication 25 MILLIGRAM(S): at 17:21

## 2021-12-05 RX ADMIN — Medication 3 MILLILITER(S): at 01:15

## 2021-12-05 RX ADMIN — Medication 400 MILLIGRAM(S): at 11:54

## 2021-12-05 RX ADMIN — OLANZAPINE 15 MILLIGRAM(S): 15 TABLET, FILM COATED ORAL at 21:45

## 2021-12-05 RX ADMIN — SENNA PLUS 2 TABLET(S): 8.6 TABLET ORAL at 21:44

## 2021-12-05 RX ADMIN — CEFTRIAXONE 100 MILLIGRAM(S): 500 INJECTION, POWDER, FOR SOLUTION INTRAMUSCULAR; INTRAVENOUS at 21:47

## 2021-12-05 RX ADMIN — AMLODIPINE BESYLATE 5 MILLIGRAM(S): 2.5 TABLET ORAL at 11:53

## 2021-12-05 RX ADMIN — ENOXAPARIN SODIUM 40 MILLIGRAM(S): 100 INJECTION SUBCUTANEOUS at 11:53

## 2021-12-05 RX ADMIN — DIVALPROEX SODIUM 1000 MILLIGRAM(S): 500 TABLET, DELAYED RELEASE ORAL at 21:45

## 2021-12-05 RX ADMIN — LIDOCAINE 1 PATCH: 4 CREAM TOPICAL at 09:52

## 2021-12-05 RX ADMIN — Medication 1 PATCH: at 21:40

## 2021-12-05 RX ADMIN — Medication 20 MILLIGRAM(S): at 17:19

## 2021-12-05 RX ADMIN — Medication 0.5 MILLIGRAM(S): at 17:07

## 2021-12-05 RX ADMIN — Medication 20 MILLIGRAM(S): at 01:44

## 2021-12-05 RX ADMIN — Medication 3 MILLILITER(S): at 22:31

## 2021-12-05 RX ADMIN — Medication 0.5 MILLIGRAM(S): at 05:16

## 2021-12-05 RX ADMIN — Medication 1 PATCH: at 05:50

## 2021-12-05 RX ADMIN — Medication 25 MILLIGRAM(S): at 05:53

## 2021-12-05 RX ADMIN — Medication 500 MILLIGRAM(S): at 11:40

## 2021-12-05 RX ADMIN — LIDOCAINE 1 PATCH: 4 CREAM TOPICAL at 05:49

## 2021-12-05 RX ADMIN — Medication 1 PATCH: at 21:44

## 2021-12-05 RX ADMIN — Medication 1000 MILLIGRAM(S): at 12:09

## 2021-12-05 RX ADMIN — Medication 3 MILLILITER(S): at 05:16

## 2021-12-05 RX ADMIN — POLYETHYLENE GLYCOL 3350 17 GRAM(S): 17 POWDER, FOR SOLUTION ORAL at 11:53

## 2021-12-05 RX ADMIN — AZITHROMYCIN 250 MILLIGRAM(S): 500 TABLET, FILM COATED ORAL at 05:54

## 2021-12-05 RX ADMIN — Medication 3 MILLILITER(S): at 10:20

## 2021-12-05 RX ADMIN — Medication 1 PATCH: at 18:42

## 2021-12-05 NOTE — PROGRESS NOTE ADULT - SUBJECTIVE AND OBJECTIVE BOX
SURGERY PROGRESS NOTE  Procedure/Dx: Placement of trochanteric nail into right hip    Pt seen and assessed at bedside. No overnight events. Pain well controlled. Denies N/V Dizziness SOB or CP    Vital Signs Last 24 Hrs  T(C): 36.7 (12-05-21 @ 11:00), Max: 37.6 (12-04-21 @ 19:00)  T(F): 98.1 (12-05-21 @ 11:00), Max: 99.7 (12-04-21 @ 19:00)  HR: 72 (12-05-21 @ 14:00) (57 - 90)  BP: 150/68 (12-05-21 @ 14:00) (141/76 - 173/83)  BP(mean): 98 (12-05-21 @ 14:00) (98 - 119)  RR: 18 (12-05-21 @ 14:00) (11 - 24)  SpO2: 90% (12-05-21 @ 14:00) (90% - 100%)      PHYSICAL EXAM   General:  AAOx3 in NAD  Neck:  Supple, FOM, no palpable masses  HEENT:  Normocephalic, atraumatic, nonicteric sclera, MMM   Chest:  Equal expansion bilaterally, equal breath sounds  Abdomen:  Soft, nondistended, nontender to palpation in all four quadrants       04 Dec 2021 07:01  -  05 Dec 2021 07:00  --------------------------------------------------------  IN:    dextrose 5% + sodium chloride 0.9%: 1725 mL    IV PiggyBack: 600 mL    Oral Fluid: 180 mL    Sodium Chloride 0.9% Bolus: 500 mL  Total IN: 3005 mL    OUT:    Indwelling Catheter - Urethral (mL): 1135 mL  Total OUT: 1135 mL    Total NET: 1870 mL      05 Dec 2021 07:01  -  05 Dec 2021 14:48  --------------------------------------------------------  IN:    dextrose 5% + sodium chloride 0.9%: 150 mL  Total IN: 150 mL    OUT:    Indwelling Catheter - Urethral (mL): 675 mL  Total OUT: 675 mL    Total NET: -525 mL            LABS:  cret                        8.2    26.86 )-----------( 406      ( 04 Dec 2021 23:36 )             25.0     12-04    133<L>  |  101  |  44<H>  ----------------------------<  132<H>  5.0   |  21<L>  |  1.15    Ca    9.0      04 Dec 2021 23:36  Phos  4.4     12-04  Mg     2.2     12-04      PT/INR - ( 04 Dec 2021 01:31 )   PT: 11.3 sec;   INR: 0.94 ratio         PTT - ( 04 Dec 2021 01:31 )  PTT:26.4 sec

## 2021-12-05 NOTE — PROGRESS NOTE ADULT - SUBJECTIVE AND OBJECTIVE BOX
24 HOUR EVENTS:  -CTA neck done, pending official read  - OR with ortho 12/4 for IMN  - lopressor started, 25 BID  - 500 cc bolus for decreased UOP    HISTORY  Patient is a 71y Male with PMH of Schizophrenia, HTN, current smoker (1/2pk a day), new dx of COPD who was initially admitted to [facility] after a fall on the sun deck at [facility] with cc of hip pain. Patient found to have Right comminuted Intertrochanteric fracture and was suppose to go for ORIF on 12/2. CXR showed mild right apical pneumothorax, persistent opacities. While on the floor, patient dessated, RRT was called, General surgery was called, patient then had serial CT done which revealed 4 right rib fx, with hemopneumothorax and pneumomediastinum and grade 3 liver laceration and no pulmonary embolism. Patient then transferred to [facility] trauma, for possible IR intervention for grade 3 liver laceration.   Patient admitted to SICU, hypoxic on HFNC, c/o right hip pain.     SUBJECTIVE/ROS:  [x ] A ten-point review of systems was otherwise negative except as noted.  [ ] Due to altered mental status/intubation, subjective information were not able to be obtained from the patient. History was obtained, to the extent possible, from review of the chart and collateral sources of information.      NEURO  Exam: awake, alert, oriented  Meds: acetaminophen   IVPB .. 1000 milliGRAM(s) IV Intermittent every 6 hours PRN Mild Pain (1 - 3)  diVALproex ER 1000 milliGRAM(s) Oral every 24 hours  HYDROmorphone  Injectable 0.25 milliGRAM(s) IV Push every 4 hours PRN breakthrough pain  OLANZapine 15 milliGRAM(s) Oral at bedtime  traMADol 25 milliGRAM(s) Oral every 6 hours PRN Moderate Pain (4 - 6)  traMADol 50 milliGRAM(s) Oral every 6 hours PRN Severe Pain (7 - 10)    [x] Adequacy of sedation and pain control has been assessed and adjusted      RESPIRATORY  RR: 14 (12-05-21 @ 01:00) (13 - 28)  SpO2: 97% (12-05-21 @ 01:00) (90% - 100%)  Exam: unlabored, clear to auscultation bilaterally  Mechanical Ventilation:   ABG - ( 04 Dec 2021 23:29 )  pH: 7.40  /  pCO2: 38    /  pO2: 101   / HCO3: 24    / Base Excess: -1.1  /  SaO2: 99.0                [N/A] Extubation Readiness Assessed  Meds: albuterol/ipratropium for Nebulization 3 milliLiter(s) Nebulizer every 4 hours  buDESOnide    Inhalation Suspension 0.5 milliGRAM(s) Inhalation every 12 hours        CARDIOVASCULAR  HR: 62 (12-05-21 @ 01:00) (60 - 103)  BP: 157/73 (12-05-21 @ 01:00) (141/76 - 183/79)  BP(mean): 105 (12-05-21 @ 01:00) (101 - 119)  VBG - ( 04 Dec 2021 01:12 )  pH: 7.42  /  pCO2: 39    /  pO2: 36    / HCO3: 25    / Base Excess: 0.8   /  SaO2: 65.2   Lactate: 0.9        Exam: regular rate and rhythm  Cardiac Rhythm: sinus  Perfusion     [x]Adequate   [ ]Inadequate  Mentation   [x]Normal       [ ]Reduced  Extremities  [x]Warm         [ ]Cool  Volume Status [ ]Hypervolemic [x]Euvolemic [ ]Hypovolemic  Meds: metoprolol tartrate 25 milliGRAM(s) Oral two times a day        GI/NUTRITION  Exam: soft, nontender, nondistended, incision C/D/I  Diet:  Meds: polyethylene glycol 3350 17 Gram(s) Oral daily  senna 2 Tablet(s) Oral at bedtime      GENITOURINARY  I&O's Detail    12-03 @ 07:01  -  12-04 @ 07:00  --------------------------------------------------------  IN:    dextrose 5% + sodium chloride 0.9%: 600 mL    dextrose 5% + sodium chloride 0.9%: 375 mL    IV PiggyBack: 600 mL    Oral Fluid: 1050 mL  Total IN: 2625 mL    OUT:    Indwelling Catheter - Urethral (mL): 1250 mL  Total OUT: 1250 mL    Total NET: 1375 mL      12-04 @ 07:01  -  12-05 @ 01:39  --------------------------------------------------------  IN:    dextrose 5% + sodium chloride 0.9%: 1275 mL    IV PiggyBack: 350 mL    Sodium Chloride 0.9% Bolus: 500 mL  Total IN: 2125 mL    OUT:    Indwelling Catheter - Urethral (mL): 875 mL  Total OUT: 875 mL    Total NET: 1250 mL        Weight (kg): 88.6 (12-04 @ 10:06)  12-04    133<L>  |  101  |  44<H>  ----------------------------<  132<H>  5.0   |  21<L>  |  1.15    Ca    9.0      04 Dec 2021 23:36  Phos  4.4     12-04  Mg     2.2     12-04      [ ] Ybarra catheter, indication: N/A  Meds: ascorbic acid 500 milliGRAM(s) Oral daily  dextrose 5% + sodium chloride 0.9%. 1000 milliLiter(s) IV Continuous <Continuous>        HEMATOLOGIC  Meds:   [x] VTE Prophylaxis                        8.2    26.86 )-----------( 406      ( 04 Dec 2021 23:36 )             25.0     PT/INR - ( 04 Dec 2021 01:31 )   PT: 11.3 sec;   INR: 0.94 ratio         PTT - ( 04 Dec 2021 01:31 )  PTT:26.4 sec  Transfusion     [ ] PRBC   [ ] Platelets   [ ] FFP   [ ] Cryoprecipitate      INFECTIOUS DISEASES  WBC Count: 26.86 K/uL (12-04 @ 23:36)  WBC Count: 29.06 K/uL (12-04 @ 12:35)    RECENT CULTURES:  Specimen Source: .Sputum Sputum  Date/Time: 12-02 @ 19:02  Culture Results:   Moderate Moraxella catarrhalis "Susceptibilities not performed"  Moderate Haemophilus influenzae "Susceptibilities not performed"  Moderate Streptococcus pneumoniae  Gram Stain:   Numerous polymorphonuclear leukocytes per low power field  Few Squamous epithelial cells per low power field  Moderate Gram Positive Cocci in Pairs and Chains per oil power field  Few Gram Negative Diplococci per oil power field  Rare Gram NegativeRods per oil power field  Organism: --  Specimen Source: .Blood Blood-Venous  Date/Time: 12-02 @ 19:00  Culture Results:   No growth to date.  Gram Stain: --  Organism: --    Meds: azithromycin   Tablet 250 milliGRAM(s) Oral once  azithromycin  IVPB 250 milliGRAM(s) IV Intermittent every 24 hours  cefTRIAXone   IVPB 1000 milliGRAM(s) IV Intermittent every 24 hours        ENDOCRINE  CAPILLARY BLOOD GLUCOSE        Meds: methylPREDNISolone sodium succinate Injectable 20 milliGRAM(s) IV Push every 8 hours        ACCESS DEVICES:  [ ] Peripheral IV  [ ] Central Venous Line	[ ] R	[ ] L	[ ] IJ	[ ] Fem	[ ] SC	Placed:   [ ] Arterial Line		[ ] R	[ ] L	[ ] Fem	[ ] Rad	[ ] Ax	Placed:   [ ] PICC:					[ ] Mediport  [ ] Urinary Catheter, Date Placed:   [x] Necessity of urinary, arterial, and venous catheters discussed    OTHER MEDICATIONS:  chlorhexidine 2% Cloths 1 Application(s) Topical <User Schedule>  lidocaine   4% Patch 1 Patch Transdermal daily  nicotine -  14 mG/24Hr(s) Patch 1 patch Transdermal daily      CODE STATUS:      IMAGING:

## 2021-12-05 NOTE — PROGRESS NOTE ADULT - ASSESSMENT
70 y/o male w/ a PMHx of schizophrenia, HTN, current smoker of 1/2 PPD, and COPD presenting s/p fall w/ a left C3 facet fracture, right 8th-10th rib fractures w/ associated hemopneumothorax & pneumomediastinum, and a grade 3 liver laceration with a hospital course c/b acute hypoxemic respiratory failure likely 2/2 a COPD exacerbation. S/p R IMN w/ ortho    - OOBAT, IS  - wean off HFNC  - f/u AM labs, monitor H&H  - multimodal pain control PRN        ACS  [phone]

## 2021-12-05 NOTE — PROGRESS NOTE ADULT - ASSESSMENT
Patient is a 71y Male with PMH of Schizophrenia, HTN, current smoker (1/2pk a day), new dx of COPD who was initially admitted to [facility] after a fall on the sun deck at [facility] with cc of hip pain. Patient found to have Right comminuted Intertrochanteric fracture, and right rib fx, with hemopneumothorax and grade 3 liver laceration with hypoxia r/o PE. Patient admit to SICU for hemodynamic monitoring. Now s/p Placement of trochanteric nail into right hip       Neuro: hx of schizophrenia, CTH neg, CT c-spine: non-displaced L C3-4 facet fx with C3 laminar extension  -CTA neck (ord) to no cerebrovascular injury  -NSx spine: c-collar, f/u outpt  -home olanzaprine, Depakote  -acute pain control for rib fx: tylenol, tramadol and Dilaudid PRN, lido patch  -monitor mental status    Resp: right rib fx, COPD exacerbation with hypoxic respiratory failure, likely COPD exacerbation  - seen and followed by pulmonology  -  had recent CTA no PE  -f/u CT chest (ord) for PTX resolution  -HFNC 55%/40, wean as tolerated  -noctural BiPAP at 14/7/40% for atelectasis/hypoxia  -hydrocortisone 20 q8h IVP + azithro & CTX started for COPD exacerbation  -CXR daily  -Pulmicort and Duonebs q4hr  -nicotine patch QD  -chest PT, IS  -HOB >35 degrees    CVS: hx of HTN, BPs borderline on admission, lactate cleared  -monitor hemodynamics  - lopress 25 mg BID  -holding home amlodipine and enalapril    GI:   -diet: soft and bite-sized   -bowel regimen: senna, Miralax    /Renal: hyponatremia, urine lytes & serum osmoles WNL  -D5NS @ 75 while NPO  -Uro: urine cytology (ord), rest of w/u outpt  -Ybarra placed for urinary retention  -monitor renal function and electrolytes    Heme: grade 3 liver lac, H&H stable  -qd CBC  -DVT ppx: Lovenox    ID: UA neg, sputum cx: mod G+ cocci in pairs/chains, few G- diplococci, rare G- rods  -azithro x 5 days for copd exacerbation and Ceftriaxone for poss pna  -f/u blood cx x2 (NGTD 12/3)  -monitor temp and WBC    Endo:   -monitor glucose    MSK: R IT fx, deep tissue injury over B/L sacrum with small amount of skin breakdown noted  -OR 12/4 with ortho pending pulm clearance (f/u repeat CT chest official read)  -NWB RLE, bedrest  -Advanced Cavilon applied to buttocks M-W-F; R trochanter: apply Cavilon daily   Patient is a 71y Male with PMH of Schizophrenia, HTN, current smoker (1/2pk a day), new dx of COPD who was initially admitted to [facility] after a fall on the sun deck at [facility] with cc of hip pain. Patient found to have Right comminuted Intertrochanteric fracture, and right rib fx, with hemopneumothorax and grade 3 liver laceration with hypoxia r/o PE. Patient admit to SICU for hemodynamic monitoring. Now s/p Placement of trochanteric nail into right hip       Neuro: hx of schizophrenia, CTH neg, CT c-spine: non-displaced L C3-4 facet fx with C3 laminar extension  -CTA neck (ord) to no cerebrovascular injury  -NSx spine: c-collar, f/u outpt  -home olanzaprine, Depakote  -acute pain control for rib fx: tylenol, tramadol and Dilaudid PRN, lido patch  -monitor mental status    Resp: right rib fx, COPD exacerbation with hypoxic respiratory failure, likely COPD exacerbation  - seen and followed by pulmonology  -  had recent CTA no PE  -f/u CT chest (ord) for PTX resolution  -HFNC 55%/40, wean as tolerated  -noctural BiPAP at 14/7/40% for atelectasis/hypoxia  -hydrocortisone 20 q8h IVP + azithro & CTX started for COPD exacerbation  -CXR daily  -Pulmicort and Duonebs q4hr  -nicotine patch QD  -chest PT, IS  -HOB >35 degrees    CVS: hx of HTN, BPs borderline on admission, lactate cleared  -monitor hemodynamics  - lopressor 25 mg BID  -holding home amlodipine and enalapril    GI:   -diet: soft and bite-sized   -bowel regimen: senna, Miralax    /Renal: hyponatremia, urine lytes & serum osmoles WNL  -D5NS @ 75 while NPO  -Uro: urine cytology (ord), rest of w/u outpt  -Ybarra placed for urinary retention  -monitor renal function and electrolytes    Heme: grade 3 liver lac, H&H stable  -qd CBC  -DVT ppx: Lovenox    ID: UA neg, sputum cx: mod G+ cocci in pairs/chains, few G- diplococci, rare G- rods  -azithro x 5 days for copd exacerbation and Ceftriaxone for poss pna  -f/u blood cx x2 (NGTD 12/3)  -monitor temp and WBC    Endo:   -monitor glucose    MSK: R IT fx, deep tissue injury over B/L sacrum with small amount of skin breakdown noted  -OR 12/4 with ortho pending pulm clearance (f/u repeat CT chest official read)  -NWB RLE, bedrest  -Advanced Cavilon applied to buttocks M-W-F; R trochanter: apply Cavilon daily

## 2021-12-05 NOTE — PROGRESS NOTE ADULT - SUBJECTIVE AND OBJECTIVE BOX
Orthopedics Post-op Check  POD 1 s/p R Hip IMN    Patient seen and examined at bedside. Pain is controlled. Pt feeling well. No cp nausea or vomiting. Per nurse, patient doing well, plan to attempt to discontinue high flow nasal cannula tomorrow.     Vital Signs Last 24 Hrs  T(C): 37.6 (12-04-21 @ 23:00), Max: 37.6 (12-04-21 @ 19:00)  T(F): 99.7 (12-04-21 @ 23:00), Max: 99.7 (12-04-21 @ 19:00)  HR: 62 (12-05-21 @ 01:00) (60 - 103)  BP: 157/73 (12-05-21 @ 01:00) (141/76 - 183/79)  BP(mean): 105 (12-05-21 @ 01:00) (101 - 119)  RR: 14 (12-05-21 @ 01:00) (13 - 28)  SpO2: 97% (12-05-21 @ 01:00) (90% - 100%)                        8.2    26.86 )-----------( 406      ( 04 Dec 2021 23:36 )             25.0     04 Dec 2021 23:36    133    |  101    |  44     ----------------------------<  132    5.0     |  21     |  1.15     Ca    9.0        04 Dec 2021 23:36  Phos  4.4       04 Dec 2021 23:36  Mg     2.2       04 Dec 2021 23:36      PT/INR - ( 04 Dec 2021 01:31 )   PT: 11.3 sec;   INR: 0.94 ratio         PTT - ( 04 Dec 2021 01:31 )  PTT:26.4 sec    Exam:  Gen: NAD, resting comfortably  Dressing c/d/i  +EHL/FHL/TA/GS  SILT L2-S1  +DP/PT 2+  Calf NTTP b/l  Compartments soft and compressible    A/P:  71yMale Stable POD 1  s/p R Hip IMN    SICU/medical management appreciated  -FU AM labs  FU BCx/Sputum Cx  -Cervical collar per Neurosurgery team  -WBAT  -Pain control PRN  -PT/OT  -Ppx ABX x24hrs  -DVT PE ppx: Lovenox  -Antibiotics per ID/primary team  -Incentive spirometry  -Dispo pending PT recs and medical clearance  -Will discuss with attending and advise if any changes to plan

## 2021-12-06 LAB
NON-GYNECOLOGICAL CYTOLOGY STUDY: SIGNIFICANT CHANGE UP
PROCALCITONIN SERPL-MCNC: 0.06 NG/ML — SIGNIFICANT CHANGE UP (ref 0.02–0.1)

## 2021-12-06 PROCEDURE — 93306 TTE W/DOPPLER COMPLETE: CPT | Mod: 26

## 2021-12-06 PROCEDURE — 71045 X-RAY EXAM CHEST 1 VIEW: CPT | Mod: 26

## 2021-12-06 PROCEDURE — 99232 SBSQ HOSP IP/OBS MODERATE 35: CPT

## 2021-12-06 PROCEDURE — 99233 SBSQ HOSP IP/OBS HIGH 50: CPT

## 2021-12-06 RX ORDER — ACETAMINOPHEN 500 MG
975 TABLET ORAL EVERY 8 HOURS
Refills: 0 | Status: DISCONTINUED | OUTPATIENT
Start: 2021-12-06 | End: 2021-12-06

## 2021-12-06 RX ORDER — BUDESONIDE AND FORMOTEROL FUMARATE DIHYDRATE 160; 4.5 UG/1; UG/1
2 AEROSOL RESPIRATORY (INHALATION)
Refills: 0 | Status: DISCONTINUED | OUTPATIENT
Start: 2021-12-06 | End: 2021-12-09

## 2021-12-06 RX ORDER — IPRATROPIUM/ALBUTEROL SULFATE 18-103MCG
3 AEROSOL WITH ADAPTER (GRAM) INHALATION EVERY 4 HOURS
Refills: 0 | Status: DISCONTINUED | OUTPATIENT
Start: 2021-12-06 | End: 2021-12-06

## 2021-12-06 RX ORDER — IPRATROPIUM/ALBUTEROL SULFATE 18-103MCG
3 AEROSOL WITH ADAPTER (GRAM) INHALATION EVERY 6 HOURS
Refills: 0 | Status: DISCONTINUED | OUTPATIENT
Start: 2021-12-06 | End: 2021-12-09

## 2021-12-06 RX ORDER — LACTULOSE 10 G/15ML
10 SOLUTION ORAL EVERY 6 HOURS
Refills: 0 | Status: DISCONTINUED | OUTPATIENT
Start: 2021-12-06 | End: 2021-12-09

## 2021-12-06 RX ORDER — POLYETHYLENE GLYCOL 3350 17 G/17G
17 POWDER, FOR SOLUTION ORAL
Refills: 0 | Status: DISCONTINUED | OUTPATIENT
Start: 2021-12-06 | End: 2021-12-09

## 2021-12-06 RX ADMIN — Medication 1 PATCH: at 20:58

## 2021-12-06 RX ADMIN — TRAMADOL HYDROCHLORIDE 25 MILLIGRAM(S): 50 TABLET ORAL at 21:30

## 2021-12-06 RX ADMIN — DIVALPROEX SODIUM 1000 MILLIGRAM(S): 500 TABLET, DELAYED RELEASE ORAL at 20:57

## 2021-12-06 RX ADMIN — TRAMADOL HYDROCHLORIDE 25 MILLIGRAM(S): 50 TABLET ORAL at 21:01

## 2021-12-06 RX ADMIN — HYDROMORPHONE HYDROCHLORIDE 0.25 MILLIGRAM(S): 2 INJECTION INTRAMUSCULAR; INTRAVENOUS; SUBCUTANEOUS at 12:35

## 2021-12-06 RX ADMIN — OLANZAPINE 15 MILLIGRAM(S): 15 TABLET, FILM COATED ORAL at 21:01

## 2021-12-06 RX ADMIN — Medication 1 PATCH: at 19:11

## 2021-12-06 RX ADMIN — POLYETHYLENE GLYCOL 3350 17 GRAM(S): 17 POWDER, FOR SOLUTION ORAL at 17:29

## 2021-12-06 RX ADMIN — Medication 1 PATCH: at 21:05

## 2021-12-06 RX ADMIN — AZITHROMYCIN 250 MILLIGRAM(S): 500 TABLET, FILM COATED ORAL at 05:58

## 2021-12-06 RX ADMIN — Medication 10 MILLIGRAM(S): at 11:34

## 2021-12-06 RX ADMIN — Medication 20 MILLIGRAM(S): at 05:46

## 2021-12-06 RX ADMIN — HYDROMORPHONE HYDROCHLORIDE 0.25 MILLIGRAM(S): 2 INJECTION INTRAMUSCULAR; INTRAVENOUS; SUBCUTANEOUS at 12:18

## 2021-12-06 RX ADMIN — Medication 1 PATCH: at 07:23

## 2021-12-06 RX ADMIN — AMLODIPINE BESYLATE 5 MILLIGRAM(S): 2.5 TABLET ORAL at 05:47

## 2021-12-06 RX ADMIN — Medication 500 MILLIGRAM(S): at 11:31

## 2021-12-06 RX ADMIN — Medication 25 MILLIGRAM(S): at 17:29

## 2021-12-06 RX ADMIN — Medication 25 MILLIGRAM(S): at 05:47

## 2021-12-06 RX ADMIN — Medication 3 MILLILITER(S): at 01:52

## 2021-12-06 RX ADMIN — Medication 3 MILLILITER(S): at 17:08

## 2021-12-06 RX ADMIN — CHLORHEXIDINE GLUCONATE 1 APPLICATION(S): 213 SOLUTION TOPICAL at 05:47

## 2021-12-06 RX ADMIN — ENOXAPARIN SODIUM 40 MILLIGRAM(S): 100 INJECTION SUBCUTANEOUS at 11:31

## 2021-12-06 NOTE — PROVIDER CONTACT NOTE (OTHER) - ASSESSMENT
Patients oxygen saturation in the high 80's and o2 on ABG is 65, patient mentating fine, no signs of distress. Patient on 5L NC

## 2021-12-06 NOTE — PROGRESS NOTE ADULT - SUBJECTIVE AND OBJECTIVE BOX
SURGERY PROGRESS NOTE  Procedure/Dx: Placement of trochanteric nail into right hip    Pt seen and assessed at bedside. No overnight events. Pain well controlled. Denies N/V Dizziness SOB or CP    Vital Signs Last 24 Hrs  T(C): 36.5 (06 Dec 2021 11:00), Max: 37 (05 Dec 2021 15:00)  T(F): 97.7 (06 Dec 2021 11:00), Max: 98.6 (05 Dec 2021 15:00)  HR: 83 (06 Dec 2021 12:00) (60 - 95)  BP: 151/76 (06 Dec 2021 12:00) (123/66 - 165/65)  BP(mean): 107 (06 Dec 2021 12:00) (89 - 107)  RR: 26 (06 Dec 2021 12:00) (17 - 40)  SpO2: 83% (06 Dec 2021 12:00) (83% - 100%)      PHYSICAL EXAM   General:  AAOx3 in NAD  Neck:  Supple, FOM, no palpable masses  HEENT:  Normocephalic, atraumatic, nonicteric sclera, MMM   Chest:  Equal expansion bilaterally, equal breath sounds  Abdomen:  Soft, nondistended, nontender to palpation in all four quadrants       05 Dec 2021 07:01  -  06 Dec 2021 07:00  --------------------------------------------------------  IN:    dextrose 5% + sodium chloride 0.9%: 150 mL    IV PiggyBack: 300 mL    Oral Fluid: 240 mL  Total IN: 690 mL    OUT:    Indwelling Catheter - Urethral (mL): 2185 mL  Total OUT: 2185 mL    Total NET: -1495 mL      06 Dec 2021 07:01  -  06 Dec 2021 12:32  --------------------------------------------------------  IN:  Total IN: 0 mL    OUT:    Indwelling Catheter - Urethral (mL): 475 mL  Total OUT: 475 mL    Total NET: -475 mL        LABS:  cret                        8.5    34.85 )-----------( 417      ( 05 Dec 2021 22:21 )             25.5     12-05    132<L>  |  100  |  39<H>  ----------------------------<  117<H>  5.1   |  21<L>  |  0.85    Ca    9.1      05 Dec 2021 22:21  Phos  3.0     12-05  Mg     2.0     12-05      PT/INR - ( 05 Dec 2021 22:21 )   PT: 11.8 sec;   INR: 0.98 ratio         PTT - ( 05 Dec 2021 22:21 )  PTT:18.0 sec

## 2021-12-06 NOTE — PROGRESS NOTE ADULT - ATTENDING COMMENTS
71M with PMH of Schizophrenia, HTN, current smoker (1/2pk a day), new dx of COPD who was initially admitted to [facility] after a fall and found to have Right comminuted Intertrochanteric fracture, mild right apical pneumothorax, 4th right rib fx, with hemopneumothorax and pneumomediastinum and grade 3 liver laceration.     S/P rt hip trochantric nail placement  Awake and alert, pain well controlled  S/p extubated after procedure. Titrate HFNC O2. Start wean down  IV steroid from AM Continue systemic abx for COPD   CXR preop has no ptx. Will assess with new CXR since was on positive pressure vent.  Dc CTX after today dose, change Zitro to total of 5 days.  Was NPO for procedure. Start PO with cathartics. Pt has ileus on lower zone of CXR  Off IVF  Pharm DVT ppx, HCT have been stable  PT
71M with PMH of Schizophrenia, HTN, current smoker (1/2pk a day), new dx of COPD who was initially admitted to [facility] after a fall and found to have Right comminuted Intertrochanteric fracture, mild right apical pneumothorax, 4th right rib fx, with hemopneumothorax and pneumomediastinum and grade 3 liver laceration.     Awake and alert, pain well controlled  Remains in acute hypoxemic resp failure on HFNC. Continue frequent nebs,  ICS and IV steroid for COPD exacerbation wheeze. Will wean off systemic steroid after orthopedic procedure. Continue systemic abx for COPD exacerbation  PO with cathartics. Pt has ileus on lower zone of CXR  Off IVF  Pharm DVT ppx, HCT have been stable  PT  OR ortho plan on Saturday  No contraindication critical care wise for procedure
71M with PMH of Schizophrenia, HTN, current smoker (1/2pk a day), new dx of COPD who was initially admitted to [facility] after a fall and found to have Right comminuted Intertrochanteric fracture, mild right apical pneumothorax, 4th right rib fx, with hemopneumothorax and pneumomediastinum and grade 3 liver laceration.     S/P rt hip trochantric nail placement, tolerated the procedure well  Awake and alert, pain well controlled  Titrate HFNC O2. Start wean down IV steroid to DC  DC CTX, 5 days of IV/PO Zithro   CXR no ptx.   HTN control with amlofipine, hold ACEI sec to borderline kyperkalemia  PO with cathartics. Dc IVF  Pharm DVT ppx, HCT have been stable  PT
SICU ATTENDING ATTESTATION    I have seen and examined this patient on multidisciplinary SICU rounds thismorning. I have reviewed all new labs, imaging and reports. I have participated in formulating the plan for the day, and have read and agree with the history, ROS, exam, assessment and plan as stated above, with my additions listed below:    Doing well. In collar for C3 facet fx. on home olanzipine and depakote. High flow has been weaned to NC. CXR shows minimal right base atalectasis. Getting chest PT. Still with some intermitted desaturations for which he will remain in SICU today. Will complete total 7day Zithromax for COPD exacerbation. BP well controlled now with lopressors 25mg BID and adding amlodipine 5mg. on regular diet, no recorded BM, will start with suppository today. d/c vargas today. Persistently elevated WBC, may be steroid related as no other evidence of infection. However, will hold Tylenol for 24 hours to allow fever and will culture if spikes. Pro-christopher 0 which may suggest WBC is steroid related and not infectious.     Total time spent in the care of this patient today (excluding procedures): 35 min  Over 50% of the total time was spent in discussion and coordination of care with consulting services, dietary and rehab services.    Safia Nguyen M.D., M.S.  Dept of Trauma, Acute and Critical Care
71M with PMH of Schizophrenia, HTN, current smoker (1/2pk a day), new dx of COPD who was initially admitted to [facility] after a fall and found to have Right comminuted Intertrochanteric fracture, mild right apical pneumothorax, 4th right rib fx, with hemopneumothorax and pneumomediastinum and grade 3 liver laceration.     Awake and alert, not catatonic, moves all extremities, pain well controlled  CT reviewed, small rt hemothorax b/l atelectasis with very small ptx with extensive b/l emphysematous changes. Wean down HF to NC FiO2, start frequent nebs with ICS.  PO  DC IVF  Start pharm DVT ppx, HCT have been stable  PT  OR ortho plan on Saturday

## 2021-12-06 NOTE — PROGRESS NOTE ADULT - SUBJECTIVE AND OBJECTIVE BOX
Patient seen and examined at bedside. Reports no acute complaints at this time. Pain is well controlled.      ICU Vital Signs Last 24 Hrs  T(C): 36.2 (06 Dec 2021 03:00), Max: 37 (05 Dec 2021 15:00)  T(F): 97.2 (06 Dec 2021 03:00), Max: 98.6 (05 Dec 2021 15:00)  HR: 93 (06 Dec 2021 06:00) (60 - 95)  BP: 147/69 (06 Dec 2021 06:00) (130/62 - 165/65)  BP(mean): 99 (06 Dec 2021 06:00) (89 - 105)  ABP: --  ABP(mean): --  RR: 22 (06 Dec 2021 06:00) (13 - 40)  SpO2: 87% (06 Dec 2021 06:00) (83% - 100%)                        8.5    34.85 )-----------( 417      ( 05 Dec 2021 22:21 )             25.5   12-05    132<L>  |  100  |  39<H>  ----------------------------<  117<H>  5.1   |  21<L>  |  0.85    Ca    9.1      05 Dec 2021 22:21  Phos  3.0     12-05  Mg     2.0     12-05      PHYSICAL EXAM:    Gen: NAD,      Right Lower Extremity:  Dressing clean dry intact  +EHL/FHL/TA/GS  SILT L3-S1  +DP/PT Pulses  Compartments soft  No calf TTP B/L        71yMale Stable POD 2  s/p R Hip IMN    SICU/medical management appreciated  -FU AM labs  FU BCx/Sputum Cx  -Cervical collar per Neurosurgery team  -WBAT  -Pain control PRN  -PT/OT  -Ppx ABX x24hrs  -DVT PE ppx: Lovenox  -Antibiotics per ID/primary team  -Incentive spirometry  -Dispo pending PT recs and medical clearance  -Will discuss with attending and advise if any changes to plan

## 2021-12-06 NOTE — PROGRESS NOTE ADULT - ASSESSMENT
Patient is a 71y Male with PMH of Schizophrenia, HTN, current smoker (1/2pk a day), new dx of COPD who was initially admitted to [facility] after a fall on the sun deck at [facility] with cc of hip pain. Patient found to have Right comminuted Intertrochanteric fracture, and right rib fx, with hemopneumothorax and grade 3 liver laceration with hypoxia r/o PE. Patient admit to SICU for hemodynamic monitoring. Now s/p Placement of trochanteric nail into right hip      Neuro: hx of schizophrenia, CTH neg, CT c-spine: non-displaced L C3-4 facet fx with C3 laminar extension  -CTA neck - negative for cerebrovascular injury  -NSx spine: c-collar, f/u outpt  -home olanzaprine, Depakote  -acute pain control for rib fx: tylenol, tramadol and Dilaudid PRN, lido patch  -monitor mental status    Resp: right rib fx, COPD exacerbation with hypoxic respiratory failure, likely COPD exacerbation  - seen and followed by pulmonology  - had recent CTA no PE  -CT chest - interval improvement of R PTX, enlarged pulmonary artery concerning for pHTN   -weaned to NC  -noctural BiPAP at 14/7/40% for atelectasis/hypoxia  -hydrocortisone 20 q8h IVP + azithro for COPD exacerbation  -CXR daily  -Pulmicort and Duonebs q4hr  -nicotine patch QD  -chest PT, IS  -HOB >35 degrees    CVS: hx of HTN, BPs borderline on admission, lactate cleared  -monitor hemodynamics  - lopressor 25 mg BID  - amlodipine 5 mg   - holding home enalapril given elevated K and GREGORY    GI:   -diet: soft and bite-sized   -bowel regimen: senna, Miralax    /Renal: hyponatremia, urine lytes & serum osmoles WNL  -IVL  -Uro: urine legionella negative  -c/w Ybarra for urinary retention  -monitor renal function and electrolytes    Heme: grade 3 liver lac, H&H stable  -qd CBC  -DVT ppx: Lovenox    ID: UA neg, sputum cx: morazella catarrrhalis, H. flu, strep pneumo  -azithro x 5 days for copd exacerbation   -f/u blood cx x2 (NGTD 12/5)  -monitor temp and WBC    Endo:   -monitor glucose    MSK: R IT fx, blister over posterior aspect of right thigh  -Sp R IMN (12/4) with ortho  -WBAT RLE  -Xeroform and 4x4 over right thigh blister.

## 2021-12-06 NOTE — PROGRESS NOTE ADULT - SUBJECTIVE AND OBJECTIVE BOX
Oscar Hernandez MD  Division of Hospital Medicine  Pager [phone]  If no response or off hours page: [phone]  ---------------------------------------------------------    CONNER PATINO  71y  Male      Patient is a 71y old  Male who presents with a chief complaint of R IT fx (04 Dec 2021 00:31)      INTERVAL HPI/OVERNIGHT EVENTS:  No overnight events. Now on nasal cannula      REVIEW OF SYSTEMS: 10 point ROS negative unless listed above    T(C): 36.5 (12-06-21 @ 11:00), Max: 37 (12-05-21 @ 15:00)  HR: 74 (12-06-21 @ 13:00) (60 - 95)  BP: 145/75 (12-06-21 @ 13:00) (123/66 - 165/65)  RR: 19 (12-06-21 @ 13:00) (17 - 40)  SpO2: 90% (12-06-21 @ 13:00) (83% - 100%)  Wt(kg): --Vital Signs Last 24 Hrs  T(C): 36.5 (06 Dec 2021 11:00), Max: 37 (05 Dec 2021 15:00)  T(F): 97.7 (06 Dec 2021 11:00), Max: 98.6 (05 Dec 2021 15:00)  HR: 74 (06 Dec 2021 13:00) (60 - 95)  BP: 145/75 (06 Dec 2021 13:00) (123/66 - 165/65)  BP(mean): 104 (06 Dec 2021 13:00) (89 - 107)  RR: 19 (06 Dec 2021 13:00) (17 - 40)  SpO2: 90% (06 Dec 2021 13:00) (83% - 100%)    PHYSICAL EXAM:  GENERAL: NAD  CHEST/LUNG: Course breath sounds bilaterally   HEART: Reg rate. No M/R/G.  ABDOMEN: Soft, NT/ND, Bowel sounds present  EXTREMITIES:  2+ Peripheral Pulses, No clubbing, cyanosis, or edema  PSYCH: AAOx3  NEUROLOGY: non-focal  SKIN: No rashes or lesions    Consultant(s) Notes Reviewed:  [x ] YES  [ ] NO  Care Discussed with Consultants/Other Providers [ x] YES  [ ] NO    LABS:                        8.5    34.85 )-----------( 417      ( 05 Dec 2021 22:21 )             25.5     12-05    132<L>  |  100  |  39<H>  ----------------------------<  117<H>  5.1   |  21<L>  |  0.85    Ca    9.1      05 Dec 2021 22:21  Phos  3.0     12-05  Mg     2.0     12-05      PT/INR - ( 05 Dec 2021 22:21 )   PT: 11.8 sec;   INR: 0.98 ratio         PTT - ( 05 Dec 2021 22:21 )  PTT:18.0 sec    CAPILLARY BLOOD GLUCOSE          ABG - ( 05 Dec 2021 22:13 )  pH, Arterial: 7.47  pH, Blood: x     /  pCO2: 34    /  pO2: 65    / HCO3: 25    / Base Excess: 1.2   /  SaO2: 94.0                  RADIOLOGY & ADDITIONAL TESTS:    Imaging Personally Reviewed:  [ ] YES  [ ] NO

## 2021-12-06 NOTE — PROGRESS NOTE ADULT - SUBJECTIVE AND OBJECTIVE BOX
HISTORY:  Patient is a 71y Male with PMH of Schizophrenia, HTN, current smoker (1/2pk a day), new dx of COPD who was initially admitted to [facility] after a fall on the sun deck at [facility] with cc of hip pain. Patient found to have Right comminuted Intertrochanteric fracture and was suppose to go for ORIF on 12/2. CXR showed mild right apical pneumothorax, persistent opacities. While on the floor, patient dessated, RRT was called, General surgery was called, patient then had serial CT done which revealed 4 right rib fx, with hemopneumothorax and pneumomediastinum and grade 3 liver laceration and no pulmonary embolism. Patient then transferred to [facility] trauma, for possible IR intervention for grade 3 liver laceration. Patient admitted to SICU, hypoxic on HFNC, c/o right hip pain.     24 HOUR EVENTS:  - CTA neck negative for fracture/vascular injury  - amlodipine 5 started  - weaned to nasal cannula  - Urine legionella negative  - 12/2 Sputum Cx with Moraxella catarrhalis, H. flu, and strep pneumo      SUBJECTIVE/ROS:   [x] A ten-point review of systems was otherwise negative except as noted.  [ ] Due to altered mental status/intubation, subjective information were not able to be obtained from the patient. History was obtained, to the extent possible, from review of the chart and collateral sources of information.      NEURO  Exam: AOx4, EOMI, C-collar in place.  Meds: diVALproex ER 1000 milliGRAM(s) Oral every 24 hours  HYDROmorphone  Injectable 0.25 milliGRAM(s) IV Push every 4 hours PRN breakthrough pain  OLANZapine 15 milliGRAM(s) Oral at bedtime  traMADol 25 milliGRAM(s) Oral every 6 hours PRN Moderate Pain (4 - 6)  traMADol 50 milliGRAM(s) Oral every 6 hours PRN Severe Pain (7 - 10)      RESPIRATORY  RR: 20 (12-06-21 @ 00:00) (11 - 40)  SpO2: 89% (12-06-21 @ 00:00) (87% - 100%)  Wt(kg): --  Exam:  unlabored, clear to auscultation bilaterally, no increased WOB  ABG - ( 05 Dec 2021 22:13 )  pH: 7.47  /  pCO2: 34    /  pO2: 65    / HCO3: 25    / Base Excess: 1.2   /  SaO2: 94.0    Lactate: x                Meds: albuterol/ipratropium for Nebulization 3 milliLiter(s) Nebulizer every 4 hours  buDESOnide    Inhalation Suspension 0.5 milliGRAM(s) Inhalation every 12 hours      CARDIOVASCULAR  HR: 64 (12-06-21 @ 00:00) (60 - 95)  BP: 146/69 (12-06-21 @ 00:00) (130/62 - 172/77)  BP(mean): 99 (12-06-21 @ 00:00) (89 - 114)  ABP: --  ABP(mean): --  Wt(kg): --  CVP(cm H2O): --  VBG - ( 04 Dec 2021 01:12 )  pH: 7.42  /  pCO2: 39    /  pO2: 36    / HCO3: 25    / Base Excess: 0.8   /  SaO2: 65.2   Lactate: 0.9      Exam: RRR, no murmurs, rubs, gallops  Cardiac Rhythm: sinus   Perfusion     [x]Adequate   [ ]Inadequate  Mentation   [x]Normal       [ ]Reduced  Extremities  [x]Warm         [ ]Cool  Volume Status [ ]Hypervolemic [x]Euvolemic [ ]Hypovolemic  Meds: amLODIPine   Tablet 5 milliGRAM(s) Oral daily  metoprolol tartrate 25 milliGRAM(s) Oral two times a day      GI/NUTRITION  Exam: abdomen soft, nontender, nondistended  Diet:   Meds: polyethylene glycol 3350 17 Gram(s) Oral daily  senna 2 Tablet(s) Oral at bedtime      GENITOURINARY  I&O's Detail    12-04 @ 07:01  -  12-05 @ 07:00  --------------------------------------------------------  IN:    dextrose 5% + sodium chloride 0.9%: 1725 mL    IV PiggyBack: 600 mL    Oral Fluid: 180 mL    Sodium Chloride 0.9% Bolus: 500 mL  Total IN: 3005 mL    OUT:    Indwelling Catheter - Urethral (mL): 1135 mL  Total OUT: 1135 mL    Total NET: 1870 mL      12-05 @ 07:01  -  12-06 @ 00:34  --------------------------------------------------------  IN:    dextrose 5% + sodium chloride 0.9%: 150 mL    IV PiggyBack: 50 mL    Oral Fluid: 240 mL  Total IN: 440 mL    OUT:    Indwelling Catheter - Urethral (mL): 1660 mL  Total OUT: 1660 mL    Total NET: -1220 mL          12-05    132<L>  |  100  |  39<H>  ----------------------------<  117<H>  5.1   |  21<L>  |  0.85    Ca    9.1      05 Dec 2021 22:21  Phos  3.0     12-05  Mg     2.0     12-05      Meds: ascorbic acid 500 milliGRAM(s) Oral daily      MSK  RIGHT Lower Extremity:   Skin intact, no erythema, ecchymosis, edema, dressings over right hip C/D/I   TTP over the bony prominences of the hip  NTTP over knee/ankle/foot/toes  Hip ROM limited 2/2 pain  L2-S1 SILT  Motor grossly intact throughout hip TA/EHL/FHL/GSC  + DP/PT pulses  No pain with log roll  Compartments soft and compressible  Calf nontender       HEMATOLOGIC  Meds: enoxaparin Injectable 40 milliGRAM(s) SubCutaneous every 24 hours                          8.5    34.85 )-----------( 417      ( 05 Dec 2021 22:21 )             25.5     PT/INR - ( 05 Dec 2021 22:21 )   PT: 11.8 sec;   INR: 0.98 ratio         PTT - ( 05 Dec 2021 22:21 )  PTT:18.0 sec    INFECTIOUS DISEASES  T(C): 36.4 (12-05-21 @ 23:00), Max: 37.3 (12-05-21 @ 03:00)  Wt(kg): --    Recent Cultures:  Specimen Source: .Sputum Sputum, 12-02 @ 19:02; Results   Moderate Moraxella catarrhalis "Susceptibilities not performed"  Moderate Haemophilus influenzae "Susceptibilities not performed"  Moderate Streptococcus pneumoniae  Therapy requires maximum dose of Ceftriaxone and/or  Penicillin. Interpretive criteria as follows:  Ceftriaxone breakpoints for meningitis infections:  <=0.5=Sensitive, 1.0=Intermediate, >=2.0=Resistant  Penicillin breakpoints for meningitis infections:  <=0.06=Sensitive, >= 0.12=Resistant  Ceftriaxone breakpoints for non-meningitis infections:  <=1.0=Sensitive, 2.0=Intermediate, >=4.0=Resistant  Penicillin breakpoints for non-meningitis infections:  <=2.0=Sensitive, 4.0=Intermediate, >=8.0=Resistant  Oral Penicillin breakpoints:  <=0.06=Sensitive, 0.12-1.0=Intermediate, >=2.0=Resistant  Please note: In case of suspected meningitis, CSF  interpretive criteria must be used independent of specimen source.  Normal Respiratory Yuli absent; Gram Stain:   Numerous polymorphonuclear leukocytes per low power field  Few Squamous epithelial cells per low power field  Moderate Gram Positive Cocci in Pairs and Chains per oil power field  Few Gram Negative Diplococci per oil power field  Rare Gram NegativeRods per oil power field; Organism: Streptococcus pneumoniae  Specimen Source: .Blood Blood-Venous, 12-02 @ 19:00; Results   No growth to date.; Gram Stain: --; Organism: --    Meds: azithromycin  IVPB 250 milliGRAM(s) IV Intermittent every 24 hours      ENDOCRINE    Meds: hydrocortisone sodium succinate Injectable 20 milliGRAM(s) IV Push daily  methylPREDNISolone sodium succinate Injectable 20 milliGRAM(s) IV Push every 12 hours      ACCESS DEVICES:  [x] Peripheral IV  [ ] Central Venous Line	[ ] R	[ ] L	[ ] IJ	[ ] Fem	[ ] SC		Placed:   [ ] Arterial Line			[ ] R	[ ] L	[ ] Fem	[ ] Rad	[ ] Ax	Placed:   [ ] PICC:					[ ] Mediport  [ ] Urinary Catheter, Date Placed:   [ ] Necessity of urinary, arterial, and venous catheters discussed    OTHER MEDICATIONS:  chlorhexidine 2% Cloths 1 Application(s) Topical <User Schedule>  lidocaine   4% Patch 1 Patch Transdermal daily  nicotine -  14 mG/24Hr(s) Patch 1 patch Transdermal daily      IMAGING:  CXR (12/5): Bibasilar platelike atelectasis  CTA Head and Neck (12/3): Unremarkable. Right hemothorax better seen on chest CT 12/3/2021.

## 2021-12-06 NOTE — PROGRESS NOTE ADULT - ASSESSMENT
70 y/o male w/ a PMHx of schizophrenia, HTN, current smoker of 1/2 PPD, and COPD presenting s/p fall w/ a left C3 facet fracture, right 8th-10th rib fractures w/ associated hemopneumothorax & pneumomediastinum, and a grade 3 liver laceration with a hospital course c/b acute hypoxemic respiratory failure likely 2/2 a COPD exacerbation. S/p R IMN w/ ortho    - OOBAT, IS  - wean off HFNC  - f/u AM labs, monitor H&H  - multimodal pain control PRN  - DVT PPX: LVX  - Diet: Regular        ACS  [id number]

## 2021-12-07 DIAGNOSIS — K59.00 CONSTIPATION, UNSPECIFIED: ICD-10-CM

## 2021-12-07 DIAGNOSIS — D72.829 ELEVATED WHITE BLOOD CELL COUNT, UNSPECIFIED: ICD-10-CM

## 2021-12-07 LAB
ALBUMIN SERPL ELPH-MCNC: 3.4 G/DL — SIGNIFICANT CHANGE UP (ref 3.3–5)
ALP SERPL-CCNC: 62 U/L — SIGNIFICANT CHANGE UP (ref 40–120)
ALT FLD-CCNC: 64 U/L — HIGH (ref 10–45)
ANION GAP SERPL CALC-SCNC: 11 MMOL/L — SIGNIFICANT CHANGE UP (ref 5–17)
APPEARANCE UR: CLEAR — SIGNIFICANT CHANGE UP
AST SERPL-CCNC: 47 U/L — HIGH (ref 10–40)
BACTERIA # UR AUTO: NEGATIVE — SIGNIFICANT CHANGE UP
BASOPHILS # BLD AUTO: 0 K/UL — SIGNIFICANT CHANGE UP (ref 0–0.2)
BASOPHILS NFR BLD AUTO: 0 % — SIGNIFICANT CHANGE UP (ref 0–2)
BILIRUB DIRECT SERPL-MCNC: 0.2 MG/DL — SIGNIFICANT CHANGE UP (ref 0–0.3)
BILIRUB INDIRECT FLD-MCNC: 0.3 MG/DL — SIGNIFICANT CHANGE UP (ref 0.2–1)
BILIRUB SERPL-MCNC: 0.5 MG/DL — SIGNIFICANT CHANGE UP (ref 0.2–1.2)
BILIRUB UR-MCNC: NEGATIVE — SIGNIFICANT CHANGE UP
BUN SERPL-MCNC: 32 MG/DL — HIGH (ref 7–23)
CALCIUM SERPL-MCNC: 9.3 MG/DL — SIGNIFICANT CHANGE UP (ref 8.4–10.5)
CHLORIDE SERPL-SCNC: 98 MMOL/L — SIGNIFICANT CHANGE UP (ref 96–108)
CO2 SERPL-SCNC: 24 MMOL/L — SIGNIFICANT CHANGE UP (ref 22–31)
COLOR SPEC: YELLOW — SIGNIFICANT CHANGE UP
CREAT SERPL-MCNC: 0.79 MG/DL — SIGNIFICANT CHANGE UP (ref 0.5–1.3)
CULTURE RESULTS: SIGNIFICANT CHANGE UP
CULTURE RESULTS: SIGNIFICANT CHANGE UP
DIFF PNL FLD: ABNORMAL
EOSINOPHIL # BLD AUTO: 0 K/UL — SIGNIFICANT CHANGE UP (ref 0–0.5)
EOSINOPHIL NFR BLD AUTO: 0 % — SIGNIFICANT CHANGE UP (ref 0–6)
EPI CELLS # UR: 1 /HPF — SIGNIFICANT CHANGE UP
GLUCOSE SERPL-MCNC: 80 MG/DL — SIGNIFICANT CHANGE UP (ref 70–99)
GLUCOSE UR QL: NEGATIVE — SIGNIFICANT CHANGE UP
HCT VFR BLD CALC: 30.8 % — LOW (ref 39–50)
HCT VFR BLD CALC: 31.2 % — LOW (ref 39–50)
HGB BLD-MCNC: 10.2 G/DL — LOW (ref 13–17)
HGB BLD-MCNC: 10.3 G/DL — LOW (ref 13–17)
HYALINE CASTS # UR AUTO: 1 /LPF — SIGNIFICANT CHANGE UP (ref 0–2)
KETONES UR-MCNC: SIGNIFICANT CHANGE UP
LEUKOCYTE ESTERASE UR-ACNC: NEGATIVE — SIGNIFICANT CHANGE UP
LYMPHOCYTES # BLD AUTO: 21.49 K/UL — HIGH (ref 1–3.3)
LYMPHOCYTES # BLD AUTO: 55.7 % — HIGH (ref 13–44)
MAGNESIUM SERPL-MCNC: 2 MG/DL — SIGNIFICANT CHANGE UP (ref 1.6–2.6)
MCHC RBC-ENTMCNC: 28 PG — SIGNIFICANT CHANGE UP (ref 27–34)
MCHC RBC-ENTMCNC: 28.2 PG — SIGNIFICANT CHANGE UP (ref 27–34)
MCHC RBC-ENTMCNC: 33 GM/DL — SIGNIFICANT CHANGE UP (ref 32–36)
MCHC RBC-ENTMCNC: 33.1 GM/DL — SIGNIFICANT CHANGE UP (ref 32–36)
MCV RBC AUTO: 84.6 FL — SIGNIFICANT CHANGE UP (ref 80–100)
MCV RBC AUTO: 85.5 FL — SIGNIFICANT CHANGE UP (ref 80–100)
MONOCYTES # BLD AUTO: 2.39 K/UL — HIGH (ref 0–0.9)
MONOCYTES NFR BLD AUTO: 6.2 % — SIGNIFICANT CHANGE UP (ref 2–14)
NEUTROPHILS # BLD AUTO: 14.7 K/UL — HIGH (ref 1.8–7.4)
NEUTROPHILS NFR BLD AUTO: 38.1 % — LOW (ref 43–77)
NITRITE UR-MCNC: NEGATIVE — SIGNIFICANT CHANGE UP
NRBC # BLD: 0 /100 WBCS — SIGNIFICANT CHANGE UP (ref 0–0)
PH UR: 6 — SIGNIFICANT CHANGE UP (ref 5–8)
PHOSPHATE SERPL-MCNC: 2.6 MG/DL — SIGNIFICANT CHANGE UP (ref 2.5–4.5)
PLATELET # BLD AUTO: 460 K/UL — HIGH (ref 150–400)
PLATELET # BLD AUTO: 515 K/UL — HIGH (ref 150–400)
POTASSIUM SERPL-MCNC: 4.3 MMOL/L — SIGNIFICANT CHANGE UP (ref 3.5–5.3)
POTASSIUM SERPL-SCNC: 4.3 MMOL/L — SIGNIFICANT CHANGE UP (ref 3.5–5.3)
PROCALCITONIN SERPL-MCNC: 0.1 NG/ML — SIGNIFICANT CHANGE UP (ref 0.02–0.1)
PROT SERPL-MCNC: 6.3 G/DL — SIGNIFICANT CHANGE UP (ref 6–8.3)
PROT UR-MCNC: ABNORMAL
RBC # BLD: 3.64 M/UL — LOW (ref 4.2–5.8)
RBC # BLD: 3.65 M/UL — LOW (ref 4.2–5.8)
RBC # FLD: 16.4 % — HIGH (ref 10.3–14.5)
RBC # FLD: 16.5 % — HIGH (ref 10.3–14.5)
RBC CASTS # UR COMP ASSIST: 7 /HPF — HIGH (ref 0–4)
SODIUM SERPL-SCNC: 133 MMOL/L — LOW (ref 135–145)
SP GR SPEC: 1.02 — SIGNIFICANT CHANGE UP (ref 1.01–1.02)
SPECIMEN SOURCE: SIGNIFICANT CHANGE UP
SPECIMEN SOURCE: SIGNIFICANT CHANGE UP
UROBILINOGEN FLD QL: NEGATIVE — SIGNIFICANT CHANGE UP
WBC # BLD: 35.43 K/UL — HIGH (ref 3.8–10.5)
WBC # BLD: 38.58 K/UL — HIGH (ref 3.8–10.5)
WBC # FLD AUTO: 35.43 K/UL — HIGH (ref 3.8–10.5)
WBC # FLD AUTO: 38.58 K/UL — HIGH (ref 3.8–10.5)
WBC UR QL: 3 /HPF — SIGNIFICANT CHANGE UP (ref 0–5)

## 2021-12-07 PROCEDURE — 71045 X-RAY EXAM CHEST 1 VIEW: CPT | Mod: 26

## 2021-12-07 PROCEDURE — 99232 SBSQ HOSP IP/OBS MODERATE 35: CPT

## 2021-12-07 RX ORDER — NICOTINE POLACRILEX 2 MG
1 GUM BUCCAL DAILY
Refills: 0 | Status: DISCONTINUED | OUTPATIENT
Start: 2021-12-08 | End: 2021-12-09

## 2021-12-07 RX ORDER — TRAMADOL HYDROCHLORIDE 50 MG/1
50 TABLET ORAL EVERY 6 HOURS
Refills: 0 | Status: DISCONTINUED | OUTPATIENT
Start: 2021-12-07 | End: 2021-12-09

## 2021-12-07 RX ORDER — MULTIVIT WITH MIN/MFOLATE/K2 340-15/3 G
1 POWDER (GRAM) ORAL ONCE
Refills: 0 | Status: DISCONTINUED | OUTPATIENT
Start: 2021-12-07 | End: 2021-12-09

## 2021-12-07 RX ORDER — TRAMADOL HYDROCHLORIDE 50 MG/1
25 TABLET ORAL EVERY 6 HOURS
Refills: 0 | Status: DISCONTINUED | OUTPATIENT
Start: 2021-12-07 | End: 2021-12-09

## 2021-12-07 RX ORDER — ACETAMINOPHEN 500 MG
975 TABLET ORAL EVERY 8 HOURS
Refills: 0 | Status: DISCONTINUED | OUTPATIENT
Start: 2021-12-07 | End: 2021-12-09

## 2021-12-07 RX ADMIN — ENOXAPARIN SODIUM 40 MILLIGRAM(S): 100 INJECTION SUBCUTANEOUS at 11:18

## 2021-12-07 RX ADMIN — Medication 25 MILLIGRAM(S): at 17:40

## 2021-12-07 RX ADMIN — LACTULOSE 10 GRAM(S): 10 SOLUTION ORAL at 11:18

## 2021-12-07 RX ADMIN — OLANZAPINE 15 MILLIGRAM(S): 15 TABLET, FILM COATED ORAL at 23:36

## 2021-12-07 RX ADMIN — POLYETHYLENE GLYCOL 3350 17 GRAM(S): 17 POWDER, FOR SOLUTION ORAL at 17:39

## 2021-12-07 RX ADMIN — Medication 25 MILLIGRAM(S): at 06:07

## 2021-12-07 RX ADMIN — TRAMADOL HYDROCHLORIDE 25 MILLIGRAM(S): 50 TABLET ORAL at 12:58

## 2021-12-07 RX ADMIN — Medication 125 MILLIMOLE(S): at 06:08

## 2021-12-07 RX ADMIN — Medication 975 MILLIGRAM(S): at 20:56

## 2021-12-07 RX ADMIN — Medication 975 MILLIGRAM(S): at 21:26

## 2021-12-07 RX ADMIN — TRAMADOL HYDROCHLORIDE 50 MILLIGRAM(S): 50 TABLET ORAL at 20:57

## 2021-12-07 RX ADMIN — Medication 1 PATCH: at 20:55

## 2021-12-07 RX ADMIN — SENNA PLUS 2 TABLET(S): 8.6 TABLET ORAL at 20:56

## 2021-12-07 RX ADMIN — Medication 1 PATCH: at 20:57

## 2021-12-07 RX ADMIN — Medication 975 MILLIGRAM(S): at 13:50

## 2021-12-07 RX ADMIN — TRAMADOL HYDROCHLORIDE 50 MILLIGRAM(S): 50 TABLET ORAL at 21:25

## 2021-12-07 RX ADMIN — AZITHROMYCIN 250 MILLIGRAM(S): 500 TABLET, FILM COATED ORAL at 05:00

## 2021-12-07 RX ADMIN — Medication 975 MILLIGRAM(S): at 13:19

## 2021-12-07 RX ADMIN — LACTULOSE 10 GRAM(S): 10 SOLUTION ORAL at 17:39

## 2021-12-07 RX ADMIN — Medication 1 PATCH: at 07:38

## 2021-12-07 RX ADMIN — TRAMADOL HYDROCHLORIDE 25 MILLIGRAM(S): 50 TABLET ORAL at 13:30

## 2021-12-07 RX ADMIN — Medication 500 MILLIGRAM(S): at 11:18

## 2021-12-07 RX ADMIN — BUDESONIDE AND FORMOTEROL FUMARATE DIHYDRATE 2 PUFF(S): 160; 4.5 AEROSOL RESPIRATORY (INHALATION) at 17:40

## 2021-12-07 RX ADMIN — AMLODIPINE BESYLATE 5 MILLIGRAM(S): 2.5 TABLET ORAL at 06:07

## 2021-12-07 NOTE — PROGRESS NOTE ADULT - SUBJECTIVE AND OBJECTIVE BOX
HISTORY:  72 y/o male w/ a PMHx of schizophrenia, HTN, current smoker of 1/2 PPD, and COPD who presented as a transfer from [place] for a trauma evaluation on 12/1. Patient had a fall at his facility and presented to [place] initially, where a right IT femur fracture was found. However, he was an RRT overnight for hypoxemia. A CT scan revealed right 8th-10th rib fractures w/ associated hemopneumothorax & pneumomediastinum and a grade 3 liver laceration. He was then transferred to [facility] for further management. Repeat imaging here also revealed a left C3 facet fracture. Patient was admitted to SICU for acute hypoxemic respiratory failure requiring BiPAP. Patient reports right hip pain but otherwise denies headache, weakness, dizziness, fevers, chills, shortness of breath, chest pain, abdominal pain, or nausea/vomiting.    24 HOUR EVENTS:  - Constipated so increased Miralax from daily -> BID, added lactulose 10 g q6hrs, and gave a Dulcolax suppository x1  - Decreased Duoneb frequency from q4hrs -> q6hrs  - Changed Pulmicort to Symbicort  - TTE demonstrated normal LV and grossly normal RV  HISTORY:  70 y/o male w/ a PMHx of schizophrenia, HTN, current smoker of 1/2 PPD, and COPD who presented as a transfer from [place] for a trauma evaluation on 12/1. Patient had a fall at his facility and presented to [place] initially, where a right IT femur fracture was found. However, he was an RRT overnight for hypoxemia. A CT scan revealed right 8th-10th rib fractures w/ associated hemopneumothorax & pneumomediastinum and a grade 3 liver laceration. He was then transferred to [facility] for further management. Repeat imaging here also revealed a left C3 facet fracture. Patient was admitted to SICU for acute hypoxemic respiratory failure requiring BiPAP. Patient reports right hip pain but otherwise denies headache, weakness, dizziness, fevers, chills, shortness of breath, chest pain, abdominal pain, or nausea/vomiting.    24 HOUR EVENTS:  - Constipated so increased Miralax from daily -> BID, added lactulose 10 g q6hrs, and gave a Dulcolax suppository x1  - Decreased Duoneb frequency from q4hrs -> q6hrs  - Changed Pulmicort to Symbicort  - TTE demonstrated normal LV and grossly normal RV  - Episode of desaturation overnight w/ SpO2 of 86% on 8 L of nasal cannula but refusing high flow nasal cannula and all nebulizers HISTORY:  70 y/o male w/ a PMHx of schizophrenia, HTN, current smoker of 1/2 PPD, and COPD who presented as a transfer from [place] for a trauma evaluation on 12/1. Patient had a fall at his facility and presented to [place] initially, where a right IT femur fracture was found. However, he was an RRT overnight for hypoxemia. A CT scan revealed right 8th-10th rib fractures w/ associated hemopneumothorax & pneumomediastinum and a grade 3 liver laceration. He was then transferred to [facility] for further management. Repeat imaging here also revealed a left C3 facet fracture. Patient underwent a right IM nail on 12/4. Patient was admitted to SICU for acute hypoxemic respiratory failure requiring BiPAP. Patient reports right hip incisional pain but otherwise denies headache, weakness, dizziness, fevers, chills, shortness of breath, chest pain, abdominal pain, or nausea/vomiting.    24 HOUR EVENTS:  - Constipated so increased Miralax from daily -> BID, added lactulose 10 g q6hrs, and gave a Dulcolax suppository x1  - Decreased Duoneb frequency from q4hrs -> q6hrs  - Changed Pulmicort to Symbicort  - TTE demonstrated normal LV and grossly normal RV  - Episode of desaturation overnight w/ SpO2 of 86% on 8 L of nasal cannula but refusing high flow nasal cannula and all nebulizers HISTORY:  70 y/o male w/ a PMHx of schizophrenia, HTN, current smoker of 1/2 PPD, and COPD who presented as a transfer from [place] for a trauma evaluation on 12/1. Patient had a fall at his facility and presented to [place] initially, where a right IT femur fracture was found. However, he was an RRT overnight for hypoxemia. A CT scan revealed right 8th-10th rib fractures w/ associated hemopneumothorax & pneumomediastinum and a grade 3 liver laceration. He was then transferred to [facility] for further management. Repeat imaging here also revealed a left C3 facet fracture. Patient underwent a right IM nail on 12/4. Patient was admitted to SICU for acute hypoxemic respiratory failure requiring BiPAP. Patient reports right hip incisional pain but otherwise denies headache, weakness, dizziness, fevers, chills, shortness of breath, chest pain, abdominal pain, or nausea/vomiting.    24 HOUR EVENTS:  - Constipated so increased Miralax from daily -> BID, added lactulose 10 g q6hrs, and gave a Dulcolax suppository x1  - Decreased Duoneb frequency from q4hrs -> q6hrs  - Changed Pulmicort to Symbicort  - TTE demonstrated normal LV and grossly normal RV  - Episode of desaturation overnight w/ SpO2 of 86% on 8 L of nasal cannula but refusing high flow nasal cannula and all nebulizers    NEURO  Exam: awake, alert, oriented x3, no acute distress, no acute focal deficits  Meds:  - diVALproex ER 1000 milliGRAM(s) Oral every 24 hours  - lidocaine   4% Patch 1 Patch Transdermal daily  - OLANZapine 15 milliGRAM(s) Oral at bedtime  - traMADol 25 milliGRAM(s) Oral every 6 hours PRN Moderate Pain (4 - 6)  - traMADol 50 milliGRAM(s) Oral every 6 hours PRN Severe Pain (7 - 10)    RESPIRATORY  RR: 21 (12-07-21 @ 05:00) (14 - 37)  SpO2: 92% (12-07-21 @ 05:00) (83% - 94%)  Exam: mild rhonchi in all lung fields  Meds:  - albuterol/ipratropium for Nebulization 3 milliLiter(s) Nebulizer every 6 hours  - budesonide 160 MICROgram(s)/formoterol 4.5 MICROgram(s) Inhaler 2 Puff(s) Inhalation two times a day    CARDIOVASCULAR  HR: 79 (12-07-21 @ 05:00) (61 - 109)  BP: 142/72 (12-07-21 @ 05:00) (119/64 - 170/108)  BP(mean): 97 (12-07-21 @ 05:00) (85 - 134)  Exam: regular rate and rhythm, S1S2  Cardiac Rhythm: sinus  Perfusion    [x]Adequate    [ ]Inadequate  Mentation   [x]Normal       [ ]Reduced  Extremities  [x]Warm         [ ]Cool  Volume Status [ ]Hypervolemic [x]Euvolemic [ ]Hypovolemic  Meds:  - amLODIPine   Tablet 5 milliGRAM(s) Oral daily  - metoprolol tartrate 25 milliGRAM(s) Oral two times a day    GI/NUTRITION  Exam: soft, nontender, nondistended  Diet: regular  Meds:  - lactulose Syrup 10 Gram(s) Oral every 6 hours  - polyethylene glycol 3350 17 Gram(s) Oral two times a day  - senna 2 Tablet(s) Oral at bedtime    GENITOURINARY  I&O's Detail  12-06 @ 07:01  -  12-07 @ 06:01  --------------------------------------------------------  IN:    IV PiggyBack: 375 mL    Oral Fluid: 120 mL  Total IN: 495 mL    OUT:    Indwelling Catheter - Urethral (mL): 2005 mL  Total OUT: 2005 mL    Total NET: -1510 mL    133<L>  |  98  |  32<H>  ----------------------------<  80  4.3   |  24  |  0.79    Ca    9.3      07 Dec 2021 04:56  Phos  2.6  Mg     2.0    HEMATOLOGIC  Meds: enoxaparin Injectable 40 milliGRAM(s) SubCutaneous every 24 hours                        10.2   35.43 )-----------( 460      ( 07 Dec 2021 04:56 )             30.8     PT/INR - ( 05 Dec 2021 22:21 )   PT: 11.8 sec;   INR: 0.98 ratio    PTT - ( 05 Dec 2021 22:21 )  PTT:18.0 sec    INFECTIOUS DISEASES  T(C): 37.2 (12-07-21 @ 03:00), Max: 37.5 (12-06-21 @ 23:00)  WBC Count: 35.43 K/uL (12-07 @ 04:56)    Recent Cultures:  - Specimen Source: .Sputum Sputum, 12-02 @ 19:02  Results: Moderate Moraxella catarrhalis. Moderate Haemophilus influenzae. Moderate Streptococcus pneumoniae. Normal Respiratory Yuli absent.  Gram Stain: Numerous polymorphonuclear leukocytes per low power field. Few Squamous epithelial cells per low power field. Moderate Gram Positive Cocci in Pairs and Chains per oil power field. Few Gram Negative Diplococci per oil power field. Rare Gram Negative Rods per oil power field.  Organism: Streptococcus pneumoniae  - Specimen Source: .Blood Blood-Venous, 12-02 @ 19:00  Results: No growth to date.  Gram Stain: --  Organism: --    ENDOCRINE  Capillary Blood Glucose: 80 mg/dL (12-07-21 @ 04:56)    ACCESS DEVICES:  [x] Peripheral IV  [ ] Central Venous Line		[ ] R	[ ] L	[ ] IJ	[ ] Fem	[ ] SC	Placed:   [ ] Arterial Line			[ ] R	[ ] L	[ ] Fem	[ ] Rad	[ ] Ax	Placed:   [ ] PICC:					[ ] Mediport  [x] Urinary Catheter, Date Placed: 12/2  [x] Necessity of urinary, arterial, and venous catheters discussed    OTHER MEDICATIONS:  - ascorbic acid 500 milliGRAM(s) Oral daily  - chlorhexidine 2% Cloths 1 Application(s) Topical <User Schedule>  - nicotine -  14 mG/24Hr(s) Patch 1 patch Transdermal daily    IMAGING:

## 2021-12-07 NOTE — PROGRESS NOTE ADULT - SUBJECTIVE AND OBJECTIVE BOX
Patient seen and examined at bedside. Reports no acute complaints at this time. Pain is well controlled. Per SICU providers, patient refusing Albuterol treatments and HFNC, on 5L NC at this time.     Vital Signs Last 24 Hrs  T(C): 37.5 (06 Dec 2021 23:00), Max: 37.5 (06 Dec 2021 23:00)  T(F): 99.5 (06 Dec 2021 23:00), Max: 99.5 (06 Dec 2021 23:00)  HR: 77 (07 Dec 2021 00:00) (61 - 109)  BP: 145/70 (07 Dec 2021 00:00) (119/64 - 170/108)  BP(mean): 100 (07 Dec 2021 00:00) (85 - 134)  RR: 23 (07 Dec 2021 00:00) (17 - 37)  SpO2: 91% (07 Dec 2021 00:00) (83% - 96%)                            8.5    34.85 )-----------( 417      ( 05 Dec 2021 22:21 )             25.5         PHYSICAL EXAM:    Gen: NAD,      RLE:  Dressing clean dry intact  +EHL/FHL/TA/GS  SILT L3-S1  +DP/PT Pulses  Compartments soft  No calf TTP B/L        71yMale Stable POD 3  s/p R Hip IMN    SICU/medical management appreciated  -FU AM labs  BCx NGTD  Sputum Cx: Polymicrobial; Antibotics per ID Team (Azithromycin and Ceftriaxone)  -Cervical collar per Neurosurgery team  -WBAT/PT/OT  -Pain control PRN  -DVT PE ppx: Lovenox  -Incentive spirometry  -Dispo pending PT recs and medical clearance  -Will discuss with attending and advise if any changes to plan

## 2021-12-07 NOTE — PROGRESS NOTE ADULT - ASSESSMENT
70 y/o male w/ a PMHx of schizophrenia, HTN, current smoker of 1/2 PPD, and COPD presenting s/p fall w/ a left C3 facet fracture, right 8th-10th rib fractures w/ associated hemopneumothorax & pneumomediastinum, and a grade 3 liver laceration with a hospital course c/b acute hypoxemic respiratory failure secondary to likely a COPD exacerbation    PLAN:    Neuro: schizophrenia, left C3 facet fracture  - Assess neurovascular status every 4 hours and cervical collar at all times for left C3 facet fracture  - Multimodal pain control w/ PRN acetaminophen, PRN tramadol, and lidocaine patch  - Home Depakote and olanzapine for schizophrenia    Resp: COPD, right 8th-10th rib fractures w/ associated hemopneumothorax & pneumomediastinum, acute hypoxemic respiratory failure likely secondary to COPD exacerbation, active smoker  - Out of bed to chair and incentive spirometry to prevent atelectasis  - Duoneb, Pulmicort, and Solu-Medrol for COPD w/ exacerbation although patient has been refusing  - Nicotine patch as patient is active smoker  - Azithromycin for COPD exacerbation to complete today (5 day course total)    CV: HTN  - Metoprolol and home amlodipine for HTN  - Will restart home enalapril, triamterene-HCTZ, and clonidine as BP allows    GI: grade 3 liver laceration  - Regular diet as tolerated  - Monitor LFTs  - Bowel regimen with senna, Miralax, and lactulose    Renal: no acute issues  - Monitor I&Os and replete electrolytes as necessary    Heme: acute blood loss anemia  - Lovenox for VTE prophylaxis    ID: no acute issues  - Monitor for clinical evidence of active infection    Endo: no acute issues  - Monitor glucose on BMP    Code Status: Full code    Disposition: Will remain in SICU    Daja Puga PA-C     [id number] 70 y/o male w/ a PMHx of schizophrenia, HTN, current smoker of 1/2 PPD, and COPD presenting s/p fall w/ a left C3 facet fracture, right 8th-10th rib fractures w/ associated hemopneumothorax & pneumomediastinum, a grade 3 liver laceration, and right IT femur fracture s/p IM nail with a hospital course c/b acute hypoxemic respiratory failure secondary to likely a COPD exacerbation    PLAN:    Neuro: schizophrenia, left C3 facet fracture  - Assess neurovascular status every 4 hours and cervical collar at all times for left C3 facet fracture  - Multimodal pain control w/ PRN acetaminophen, PRN tramadol, and lidocaine patch  - Home Depakote and olanzapine for schizophrenia    Resp: COPD, right 8th-10th rib fractures w/ associated hemopneumothorax & pneumomediastinum, acute hypoxemic respiratory failure likely secondary to COPD exacerbation, active smoker  - Out of bed to chair and incentive spirometry to prevent atelectasis  - Duoneb, Pulmicort, and Solu-Medrol for COPD w/ exacerbation although patient has been refusing  - Nicotine patch as patient is active smoker  - Azithromycin for COPD exacerbation to complete today (5 day course total)    CV: HTN  - Metoprolol and home amlodipine for HTN  - Will restart home enalapril, triamterene-HCTZ, and clonidine as BP allows    GI: grade 3 liver laceration  - Regular diet as tolerated  - Monitor LFTs  - Bowel regimen with senna, Miralax, and lactulose    Renal: no acute issues  - Monitor I&Os and replete electrolytes as necessary    Heme: acute blood loss anemia  - Lovenox for VTE prophylaxis    ID: no acute issues  - Monitor for clinical evidence of active infection    Endo: no acute issues  - Monitor glucose on BMP    MSK: right IT femur fracture s/p IM nail  - RLE WBAT    Code Status: Full code    Disposition: Will remain in [place] [facility]    Daja Puga PA-C     [id number]

## 2021-12-07 NOTE — PROGRESS NOTE ADULT - SUBJECTIVE AND OBJECTIVE BOX
Oscar Hernandez MD  Division of Hospital Medicine  Pager [phone]  If no response or off hours page: [phone]  ---------------------------------------------------------    CONNER PATINO  71y  Male      Patient is a 71y old  Male who presents with a chief complaint of R IT fx (04 Dec 2021 00:31)      INTERVAL HPI/OVERNIGHT EVENTS:  Seen at bedside. Destaturated overnight, now on 2L NC. Constipated       REVIEW OF SYSTEMS: 10 point ROS negative unless listed above    T(C): 36.9 (12-07-21 @ 11:00), Max: 37.5 (12-06-21 @ 23:00)  HR: 82 (12-07-21 @ 13:00) (63 - 109)  BP: 129/68 (12-07-21 @ 13:00) (119/64 - 170/108)  RR: 20 (12-07-21 @ 13:00) (14 - 37)  SpO2: 91% (12-07-21 @ 13:00) (83% - 94%)  Wt(kg): --Vital Signs Last 24 Hrs  T(C): 36.9 (07 Dec 2021 11:00), Max: 37.5 (06 Dec 2021 23:00)  T(F): 98.4 (07 Dec 2021 11:00), Max: 99.5 (06 Dec 2021 23:00)  HR: 82 (07 Dec 2021 13:00) (63 - 109)  BP: 129/68 (07 Dec 2021 13:00) (119/64 - 170/108)  BP(mean): 90 (07 Dec 2021 13:00) (85 - 134)  RR: 20 (07 Dec 2021 13:00) (14 - 37)  SpO2: 91% (07 Dec 2021 13:00) (83% - 94%)    PHYSICAL EXAM:  GENERAL: NAD  CHEST/LUNG: Course breath sounds bilaterally   HEART: Reg rate. No M/R/G.  ABDOMEN: Soft, NT/ND, Bowel sounds present  EXTREMITIES:  2+ Peripheral Pulses, No clubbing, cyanosis, or edema  PSYCH: AAOx3  NEUROLOGY: non-focal  SKIN: No rashes or lesions    Consultant(s) Notes Reviewed:  [x ] YES  [ ] NO  Care Discussed with Consultants/Other Providers [ x] YES  [ ] NO    LABS:                        10.3   38.58 )-----------( 515      ( 07 Dec 2021 13:00 )             31.2     12-07    133<L>  |  98  |  32<H>  ----------------------------<  80  4.3   |  24  |  0.79    Ca    9.3      07 Dec 2021 04:56  Phos  2.6     12-07  Mg     2.0     12-07    TPro  6.3  /  Alb  3.4  /  TBili  0.5  /  DBili  0.2  /  AST  47<H>  /  ALT  64<H>  /  AlkPhos  62  12-07    PT/INR - ( 05 Dec 2021 22:21 )   PT: 11.8 sec;   INR: 0.98 ratio         PTT - ( 05 Dec 2021 22:21 )  PTT:18.0 sec    CAPILLARY BLOOD GLUCOSE          ABG - ( 05 Dec 2021 22:13 )  pH, Arterial: 7.47  pH, Blood: x     /  pCO2: 34    /  pO2: 65    / HCO3: 25    / Base Excess: 1.2   /  SaO2: 94.0                  RADIOLOGY & ADDITIONAL TESTS:    Imaging Personally Reviewed:  [x ] YES  [ ] NO

## 2021-12-07 NOTE — PROGRESS NOTE ADULT - ASSESSMENT
72 y/o male w/ a PMHx of schizophrenia, HTN, current smoker of 1/2 PPD, and COPD presenting s/p fall w/ a left C3 facet fracture, right 8th-10th rib fractures w/ associated hemopneumothorax & pneumomediastinum, and a grade 3 liver laceration with a hospital course c/b acute hypoxemic respiratory failure likely 2/2 a COPD exacerbation. S/p R IMN w/ ortho    - OOBAT, IS  - wean off HFNC  - f/u AM labs, monitor H&H  - multimodal pain control PRN  - DVT PPX: LVX  - Diet: Regular        ACS  [id number]

## 2021-12-07 NOTE — PROGRESS NOTE ADULT - SUBJECTIVE AND OBJECTIVE BOX
SURGERY PROGRESS NOTE  Procedure/Dx: Placement of trochanteric nail into right hip    Pt seen and assessed at bedside. No overnight events. Pain well controlled. Denies N/V Dizziness SOB or CP    Vital Signs Last 24 Hrs  T(C): 37 (07 Dec 2021 07:00), Max: 37.5 (06 Dec 2021 23:00)  T(F): 98.6 (07 Dec 2021 07:00), Max: 99.5 (06 Dec 2021 23:00)  HR: 73 (07 Dec 2021 08:00) (65 - 109)  BP: 128/69 (07 Dec 2021 08:00) (119/64 - 170/108)  BP(mean): 94 (07 Dec 2021 08:00) (85 - 134)  RR: 25 (07 Dec 2021 08:00) (14 - 37)  SpO2: 91% (07 Dec 2021 08:00) (83% - 94%)    PHYSICAL EXAM   General:  AAOx3 in NAD  Neck:  Supple, FOM, no palpable masses  HEENT:  Normocephalic, atraumatic, nonicteric sclera, MMM   Chest:  Equal expansion bilaterally, equal breath sounds  Abdomen:  Soft, nondistended, nontender to palpation in all four quadrants         06 Dec 2021 07:01  -  07 Dec 2021 07:00  --------------------------------------------------------  IN:    IV PiggyBack: 500 mL    Oral Fluid: 180 mL  Total IN: 680 mL    OUT:    Indwelling Catheter - Urethral (mL): 2055 mL  Total OUT: 2055 mL    Total NET: -1375 mL      07 Dec 2021 07:01  -  07 Dec 2021 09:07  --------------------------------------------------------  IN:  Total IN: 0 mL    OUT:    Voided (mL): 50 mL  Total OUT: 50 mL    Total NET: -50 mL        LABS:  cret                        10.2   35.43 )-----------( 460      ( 07 Dec 2021 04:56 )             30.8     12-07    133<L>  |  98  |  32<H>  ----------------------------<  80  4.3   |  24  |  0.79    Ca    9.3      07 Dec 2021 04:56  Phos  2.6     12-07  Mg     2.0     12-07      PT/INR - ( 05 Dec 2021 22:21 )   PT: 11.8 sec;   INR: 0.98 ratio         PTT - ( 05 Dec 2021 22:21 )  PTT:18.0 sec

## 2021-12-08 LAB
ANION GAP SERPL CALC-SCNC: 13 MMOL/L — SIGNIFICANT CHANGE UP (ref 5–17)
BASOPHILS # BLD AUTO: 0 K/UL — SIGNIFICANT CHANGE UP (ref 0–0.2)
BASOPHILS NFR BLD AUTO: 0 % — SIGNIFICANT CHANGE UP (ref 0–2)
BUN SERPL-MCNC: 25 MG/DL — HIGH (ref 7–23)
CALCIUM SERPL-MCNC: 8.6 MG/DL — SIGNIFICANT CHANGE UP (ref 8.4–10.5)
CHLORIDE SERPL-SCNC: 98 MMOL/L — SIGNIFICANT CHANGE UP (ref 96–108)
CO2 SERPL-SCNC: 23 MMOL/L — SIGNIFICANT CHANGE UP (ref 22–31)
CREAT SERPL-MCNC: 0.72 MG/DL — SIGNIFICANT CHANGE UP (ref 0.5–1.3)
EOSINOPHIL # BLD AUTO: 0.29 K/UL — SIGNIFICANT CHANGE UP (ref 0–0.5)
EOSINOPHIL NFR BLD AUTO: 0.9 % — SIGNIFICANT CHANGE UP (ref 0–6)
GLUCOSE SERPL-MCNC: 73 MG/DL — SIGNIFICANT CHANGE UP (ref 70–99)
HCT VFR BLD CALC: 29.9 % — LOW (ref 39–50)
HGB BLD-MCNC: 9.5 G/DL — LOW (ref 13–17)
LYMPHOCYTES # BLD AUTO: 15.21 K/UL — HIGH (ref 1–3.3)
LYMPHOCYTES # BLD AUTO: 47.2 % — HIGH (ref 13–44)
MAGNESIUM SERPL-MCNC: 1.8 MG/DL — SIGNIFICANT CHANGE UP (ref 1.6–2.6)
MANUAL SMEAR VERIFICATION: SIGNIFICANT CHANGE UP
MCHC RBC-ENTMCNC: 27.7 PG — SIGNIFICANT CHANGE UP (ref 27–34)
MCHC RBC-ENTMCNC: 31.8 GM/DL — LOW (ref 32–36)
MCV RBC AUTO: 87.2 FL — SIGNIFICANT CHANGE UP (ref 80–100)
MONOCYTES # BLD AUTO: 0.9 K/UL — SIGNIFICANT CHANGE UP (ref 0–0.9)
MONOCYTES NFR BLD AUTO: 2.8 % — SIGNIFICANT CHANGE UP (ref 2–14)
MYELOCYTES NFR BLD: 1.9 % — HIGH (ref 0–0)
NEUTROPHILS # BLD AUTO: 14.63 K/UL — HIGH (ref 1.8–7.4)
NEUTROPHILS NFR BLD AUTO: 45.4 % — SIGNIFICANT CHANGE UP (ref 43–77)
PHOSPHATE SERPL-MCNC: 3.1 MG/DL — SIGNIFICANT CHANGE UP (ref 2.5–4.5)
PLAT MORPH BLD: NORMAL — SIGNIFICANT CHANGE UP
PLATELET # BLD AUTO: 470 K/UL — HIGH (ref 150–400)
POIKILOCYTOSIS BLD QL AUTO: SLIGHT — SIGNIFICANT CHANGE UP
POLYCHROMASIA BLD QL SMEAR: SLIGHT — SIGNIFICANT CHANGE UP
POTASSIUM SERPL-MCNC: 3.9 MMOL/L — SIGNIFICANT CHANGE UP (ref 3.5–5.3)
POTASSIUM SERPL-SCNC: 3.9 MMOL/L — SIGNIFICANT CHANGE UP (ref 3.5–5.3)
PROMYELOCYTES # FLD: 1.8 % — HIGH (ref 0–0)
RBC # BLD: 3.43 M/UL — LOW (ref 4.2–5.8)
RBC # FLD: 16.6 % — HIGH (ref 10.3–14.5)
RBC BLD AUTO: ABNORMAL
SCHISTOCYTES BLD QL AUTO: SLIGHT — SIGNIFICANT CHANGE UP
SODIUM SERPL-SCNC: 134 MMOL/L — LOW (ref 135–145)
TOXIC GRANULES BLD QL SMEAR: PRESENT — SIGNIFICANT CHANGE UP
WBC # BLD: 32.22 K/UL — HIGH (ref 3.8–10.5)
WBC # FLD AUTO: 32.22 K/UL — HIGH (ref 3.8–10.5)

## 2021-12-08 PROCEDURE — 71045 X-RAY EXAM CHEST 1 VIEW: CPT | Mod: 26

## 2021-12-08 PROCEDURE — 99232 SBSQ HOSP IP/OBS MODERATE 35: CPT

## 2021-12-08 RX ORDER — MAGNESIUM SULFATE 500 MG/ML
2 VIAL (ML) INJECTION ONCE
Refills: 0 | Status: COMPLETED | OUTPATIENT
Start: 2021-12-08 | End: 2021-12-09

## 2021-12-08 RX ORDER — AMLODIPINE BESYLATE 2.5 MG/1
10 TABLET ORAL ONCE
Refills: 0 | Status: DISCONTINUED | OUTPATIENT
Start: 2021-12-08 | End: 2021-12-09

## 2021-12-08 RX ADMIN — LACTULOSE 10 GRAM(S): 10 SOLUTION ORAL at 11:56

## 2021-12-08 RX ADMIN — SENNA PLUS 2 TABLET(S): 8.6 TABLET ORAL at 21:59

## 2021-12-08 RX ADMIN — Medication 975 MILLIGRAM(S): at 15:39

## 2021-12-08 RX ADMIN — Medication 975 MILLIGRAM(S): at 21:59

## 2021-12-08 RX ADMIN — POLYETHYLENE GLYCOL 3350 17 GRAM(S): 17 POWDER, FOR SOLUTION ORAL at 06:51

## 2021-12-08 RX ADMIN — TRAMADOL HYDROCHLORIDE 50 MILLIGRAM(S): 50 TABLET ORAL at 11:26

## 2021-12-08 RX ADMIN — LIDOCAINE 1 PATCH: 4 CREAM TOPICAL at 21:59

## 2021-12-08 RX ADMIN — Medication 500 MILLIGRAM(S): at 11:56

## 2021-12-08 RX ADMIN — Medication 25 MILLIGRAM(S): at 06:49

## 2021-12-08 RX ADMIN — Medication 975 MILLIGRAM(S): at 13:48

## 2021-12-08 RX ADMIN — DIVALPROEX SODIUM 1000 MILLIGRAM(S): 500 TABLET, DELAYED RELEASE ORAL at 06:48

## 2021-12-08 RX ADMIN — BUDESONIDE AND FORMOTEROL FUMARATE DIHYDRATE 2 PUFF(S): 160; 4.5 AEROSOL RESPIRATORY (INHALATION) at 06:49

## 2021-12-08 RX ADMIN — BUDESONIDE AND FORMOTEROL FUMARATE DIHYDRATE 2 PUFF(S): 160; 4.5 AEROSOL RESPIRATORY (INHALATION) at 18:23

## 2021-12-08 RX ADMIN — TRAMADOL HYDROCHLORIDE 50 MILLIGRAM(S): 50 TABLET ORAL at 18:23

## 2021-12-08 RX ADMIN — Medication 975 MILLIGRAM(S): at 06:51

## 2021-12-08 RX ADMIN — Medication 25 MILLIGRAM(S): at 18:23

## 2021-12-08 RX ADMIN — LACTULOSE 10 GRAM(S): 10 SOLUTION ORAL at 18:23

## 2021-12-08 RX ADMIN — OLANZAPINE 15 MILLIGRAM(S): 15 TABLET, FILM COATED ORAL at 21:59

## 2021-12-08 RX ADMIN — Medication 975 MILLIGRAM(S): at 06:48

## 2021-12-08 RX ADMIN — ENOXAPARIN SODIUM 40 MILLIGRAM(S): 100 INJECTION SUBCUTANEOUS at 11:56

## 2021-12-08 RX ADMIN — TRAMADOL HYDROCHLORIDE 50 MILLIGRAM(S): 50 TABLET ORAL at 10:31

## 2021-12-08 RX ADMIN — AMLODIPINE BESYLATE 5 MILLIGRAM(S): 2.5 TABLET ORAL at 06:49

## 2021-12-08 NOTE — PROGRESS NOTE ADULT - ASSESSMENT
72 y/o male w/ a PMHx of schizophrenia, HTN, current smoker of 1/2 PPD, and COPD presenting s/p fall w/ a left C3 facet fracture, right 8th-10th rib fractures w/ associated hemopneumothorax & pneumomediastinum, and a grade 3 liver laceration with a hospital course c/b acute hypoxemic respiratory failure likely 2/2 a COPD exacerbation. S/p R IMN w/ ortho. His WBC went up to 38 yesterday and now is slowly trending down. UA negative. Blood cx pending.    Plan:  - DVT PPX: LVX  --Diet: Regular  - OOBAT, IS  - wean off HFNC  - f/u AM labs, monitor H&H  - multimodal pain control PRN  - f/u blood cx  - PT consult       ACS  [id number]

## 2021-12-08 NOTE — PROGRESS NOTE ADULT - PROBLEM SELECTOR PLAN 8
Bowel regimen increased. Would give mag citrate  - consider enema if pt does not respond to bowel regimen

## 2021-12-08 NOTE — PROGRESS NOTE ADULT - SUBJECTIVE AND OBJECTIVE BOX
Patient is a 71y old  Male who presents with a chief complaint of R IT fracture  Patient s/p right hip IM nail POD#4  Patient comfortable  No complaints    T(C): 36.4 (12-08-21 @ 04:00), Max: 37 (12-07-21 @ 07:00)  HR: 78 (12-08-21 @ 04:00) (63 - 86)  BP: 124/74 (12-08-21 @ 04:00) (113/77 - 141/66)  RR: 18 (12-08-21 @ 04:00) (17 - 31)  SpO2: 93% (12-08-21 @ 04:00) (91% - 93%)    PHYSICAL EXAM:  NAD, Alert  [Right ] Hip: Dressings C/D/I; sensation grossly intact to light touch; (+) DF/PF; (+) Distal Pulses; No Calf tenderness B/L, PAS     LABS:                        10.3   38.58 )-----------( 515      ( 07 Dec 2021 13:00 )             31.2     12-07    133<L>  |  98  |  32<H>  ----------------------------<  80  4.3   |  24  |  0.79    Ca    9.3      07 Dec 2021 04:56  Phos  2.6     12-07  Mg     2.0     12-07    TPro  6.3  /  Alb  3.4  /  TBili  0.5  /  DBili  0.2  /  AST  47<H>  /  ALT  64<H>  /  AlkPhos  62  12-07        RADIOLOGY & ADDITIONAL TESTS:

## 2021-12-08 NOTE — PROGRESS NOTE ADULT - SUBJECTIVE AND OBJECTIVE BOX
Patient seen and examined at bedside. Reports no acute complaints at this time.     ICU Vital Signs Last 24 Hrs  T(C): 36.5 (07 Dec 2021 23:45), Max: 37.2 (07 Dec 2021 03:00)  T(F): 97.7 (07 Dec 2021 23:45), Max: 99 (07 Dec 2021 03:00)  HR: 71 (07 Dec 2021 23:45) (63 - 86)  BP: 128/71 (07 Dec 2021 23:45) (113/77 - 152/97)  BP(mean): 88 (07 Dec 2021 15:00) (88 - 115)  ABP: --  ABP(mean): --  RR: 17 (07 Dec 2021 23:45) (14 - 31)  SpO2: 92% (07 Dec 2021 23:45) (91% - 93%)                        10.3   38.58 )-----------( 515      ( 07 Dec 2021 13:00 )             31.2   12-07    133<L>  |  98  |  32<H>  ----------------------------<  80  4.3   |  24  |  0.79    Ca    9.3      07 Dec 2021 04:56  Phos  2.6     12-07  Mg     2.0     12-07    TPro  6.3  /  Alb  3.4  /  TBili  0.5  /  DBili  0.2  /  AST  47<H>  /  ALT  64<H>  /  AlkPhos  62  12-07      PHYSICAL EXAM:    Gen: NAD,      Right Lower Extremity:  Dressing clean dry intact  +EHL/FHL/TA/GS  SILT L3-S1  +DP/PT Pulses  Compartments soft  No calf TTP B/L      71yMale Stable    s/p R Hip IMN    SICU/medical management appreciated  -FU AM labs  BCx NGTD  Sputum Cx: Polymicrobial; Antibotics per ID Team (Azithromycin and Ceftriaxone)  -Cervical collar per Neurosurgery team  -WBAT/PT/OT  -Pain control PRN  -DVT PE ppx: Lovenox  -Incentive spirometry  -Dispo pending PT recs and medical clearance  -Will discuss with attending and advise if any changes to plan

## 2021-12-08 NOTE — PROGRESS NOTE ADULT - SUBJECTIVE AND OBJECTIVE BOX
ACS Progress Note     SUBJECTIVE: Pt seen and examined at bedside. His white count went up to 38 yesterday, a UA and blood cx were sent. The patient states he had been eating minimal and has not had a BM in a while. His pain is controlled. He denies CP, SOB, fevers, chills, N/V/D.      Vital Signs Last 24 Hrs  T(C): 36.4 (08 Dec 2021 10:00), Max: 36.9 (07 Dec 2021 11:00)  T(F): 97.6 (08 Dec 2021 10:00), Max: 98.4 (07 Dec 2021 11:00)  HR: 68 (08 Dec 2021 10:00) (63 - 86)  BP: 117/72 (08 Dec 2021 10:00) (113/77 - 141/66)  BP(mean): 88 (07 Dec 2021 15:00) (88 - 96)  RR: 17 (08 Dec 2021 10:00) (17 - 31)  SpO2: 89% (08 Dec 2021 10:00) (89% - 93%)  I&O's Summary    07 Dec 2021 07:01  -  08 Dec 2021 07:00  --------------------------------------------------------  IN: 680 mL / OUT: 650 mL / NET: 30 mL      I&O's Detail    07 Dec 2021 07:01  -  08 Dec 2021 07:00  --------------------------------------------------------  IN:    Oral Fluid: 680 mL  Total IN: 680 mL    OUT:    Voided (mL): 650 mL  Total OUT: 650 mL    Total NET: 30 mL        Labs:                         9.5    32.22 )-----------( 470      ( 08 Dec 2021 06:58 )             29.9     12-08    134<L>  |  98  |  25<H>  ----------------------------<  73  3.9   |  23  |  0.72    Ca    8.6      08 Dec 2021 06:55  Phos  3.1     12-  Mg     1.8         TPro  6.3  /  Alb  3.4  /  TBili  0.5  /  DBili  0.2  /  AST  47<H>  /  ALT  64<H>  /  AlkPhos  62        Urinalysis Basic - ( 07 Dec 2021 15:38 )    Color: Yellow / Appearance: Clear / S.023 / pH: x  Gluc: x / Ketone: Trace  / Bili: Negative / Urobili: Negative   Blood: x / Protein: Trace / Nitrite: Negative   Leuk Esterase: Negative / RBC: 7 /hpf / WBC 3 /HPF   Sq Epi: x / Non Sq Epi: 1 /hpf / Bacteria: Negative      Physical Exam:  General:  AAOx3 in NAD  Neck:  Supple, FOM, no palpable masses  HEENT:  Normocephalic, atraumatic, nonicteric sclera, MMM   Chest:  Equal expansion bilaterally, equal breath sounds  Abdomen:  Soft, nondistended, nontender to palpation in all four quadrants

## 2021-12-08 NOTE — PROGRESS NOTE ADULT - ASSESSMENT
70 y/o M s/p right hip IM nail POD#4, L C3 Facet fracture managed by Neurosurgery, ortho stable for WBAT  Dayana Gonzalez PA-C  Orthopaedic Surgery  Team pager [phone]  [facility] [phone]  [phone]

## 2021-12-08 NOTE — PROGRESS NOTE ADULT - SUBJECTIVE AND OBJECTIVE BOX
Oscar Hernandez MD  Division of Hospital Medicine  Pager [phone]  If no response or off hours page: [phone]  ---------------------------------------------------------    CONNER PATINO  71y  Male      Patient is a 71y old  Male who presents with a chief complaint of Right hip fracture, L C3 facet fracture (08 Dec 2021 06:34)      INTERVAL HPI/OVERNIGHT EVENTS:  No overnight events. Transferred out of ICU. Still no BM as per patient. Denies pain      REVIEW OF SYSTEMS: 10 point ROS negative unless listed above    T(C): 36.6 (21 @ 11:30), Max: 36.7 (21 @ 15:00)  HR: 75 (21 @ 11:30) (68 - 78)  BP: 129/80 (21 @ 11:30) (113/77 - 139/84)  RR: 18 (21 @ 11:30) (17 - 20)  SpO2: 92% (21 @ 11:30) (89% - 93%)  Wt(kg): --Vital Signs Last 24 Hrs  T(C): 36.6 (08 Dec 2021 11:30), Max: 36.7 (07 Dec 2021 15:00)  T(F): 97.8 (08 Dec 2021 11:30), Max: 98.1 (07 Dec 2021 15:00)  HR: 75 (08 Dec 2021 11:30) (68 - 78)  BP: 129/80 (08 Dec 2021 11:30) (113/77 - 139/84)  BP(mean): 88 (07 Dec 2021 15:00) (88 - 91)  RR: 18 (08 Dec 2021 11:30) (17 - 20)  SpO2: 92% (08 Dec 2021 11:30) (89% - 93%)    PHYSICAL EXAM:  GENERAL: NAD  CHEST/LUNG: Course breath sounds bilaterally, improved  HEART: Reg rate. No M/R/G.  ABDOMEN: Soft, NT/ND, Bowel sounds present  EXTREMITIES:  2+ Peripheral Pulses, No clubbing, cyanosis, or edema. RLE IM nail dressings C/D/I  PSYCH: AAOx3  NEUROLOGY: non-focal  SKIN: No rashes or lesions    Consultant(s) Notes Reviewed:  [x ] YES  [ ] NO  Care Discussed with Consultants/Other Providers [ x] YES  [ ] NO    LABS:                        9.5    32.22 )-----------( 470      ( 08 Dec 2021 06:58 )             29.9     12-    134<L>  |  98  |  25<H>  ----------------------------<  73  3.9   |  23  |  0.72    Ca    8.6      08 Dec 2021 06:55  Phos  3.1     12-  Mg     1.8         TPro  6.3  /  Alb  3.4  /  TBili  0.5  /  DBili  0.2  /  AST  47<H>  /  ALT  64<H>  /  AlkPhos  62  12      Urinalysis Basic - ( 07 Dec 2021 15:38 )    Color: Yellow / Appearance: Clear / S.023 / pH: x  Gluc: x / Ketone: Trace  / Bili: Negative / Urobili: Negative   Blood: x / Protein: Trace / Nitrite: Negative   Leuk Esterase: Negative / RBC: 7 /hpf / WBC 3 /HPF   Sq Epi: x / Non Sq Epi: 1 /hpf / Bacteria: Negative      CAPILLARY BLOOD GLUCOSE            Urinalysis Basic - ( 07 Dec 2021 15:38 )    Color: Yellow / Appearance: Clear / S.023 / pH: x  Gluc: x / Ketone: Trace  / Bili: Negative / Urobili: Negative   Blood: x / Protein: Trace / Nitrite: Negative   Leuk Esterase: Negative / RBC: 7 /hpf / WBC 3 /HPF   Sq Epi: x / Non Sq Epi: 1 /hpf / Bacteria: Negative        RADIOLOGY & ADDITIONAL TESTS:    Imaging Personally Reviewed:  [ ] YES  [ ] NO

## 2021-12-09 ENCOUNTER — TRANSCRIPTION ENCOUNTER (OUTPATIENT)
Age: 71
End: 2021-12-09

## 2021-12-09 VITALS
SYSTOLIC BLOOD PRESSURE: 128 MMHG | DIASTOLIC BLOOD PRESSURE: 70 MMHG | HEART RATE: 78 BPM | TEMPERATURE: 98 F | OXYGEN SATURATION: 94 % | RESPIRATION RATE: 18 BRPM

## 2021-12-09 LAB
ANION GAP SERPL CALC-SCNC: 11 MMOL/L — SIGNIFICANT CHANGE UP (ref 5–17)
BUN SERPL-MCNC: 21 MG/DL — SIGNIFICANT CHANGE UP (ref 7–23)
CALCIUM SERPL-MCNC: 8.8 MG/DL — SIGNIFICANT CHANGE UP (ref 8.4–10.5)
CHLORIDE SERPL-SCNC: 98 MMOL/L — SIGNIFICANT CHANGE UP (ref 96–108)
CO2 SERPL-SCNC: 25 MMOL/L — SIGNIFICANT CHANGE UP (ref 22–31)
CREAT SERPL-MCNC: 0.71 MG/DL — SIGNIFICANT CHANGE UP (ref 0.5–1.3)
GLUCOSE BLDC GLUCOMTR-MCNC: 83 MG/DL — SIGNIFICANT CHANGE UP (ref 70–99)
GLUCOSE SERPL-MCNC: 68 MG/DL — LOW (ref 70–99)
HCT VFR BLD CALC: 31 % — LOW (ref 39–50)
HGB BLD-MCNC: 9.9 G/DL — LOW (ref 13–17)
MAGNESIUM SERPL-MCNC: 1.8 MG/DL — SIGNIFICANT CHANGE UP (ref 1.6–2.6)
MCHC RBC-ENTMCNC: 28.3 PG — SIGNIFICANT CHANGE UP (ref 27–34)
MCHC RBC-ENTMCNC: 31.9 GM/DL — LOW (ref 32–36)
MCV RBC AUTO: 88.6 FL — SIGNIFICANT CHANGE UP (ref 80–100)
NRBC # BLD: 0 /100 WBCS — SIGNIFICANT CHANGE UP (ref 0–0)
PHOSPHATE SERPL-MCNC: 2.8 MG/DL — SIGNIFICANT CHANGE UP (ref 2.5–4.5)
PLATELET # BLD AUTO: 471 K/UL — HIGH (ref 150–400)
POTASSIUM SERPL-MCNC: 4.1 MMOL/L — SIGNIFICANT CHANGE UP (ref 3.5–5.3)
POTASSIUM SERPL-SCNC: 4.1 MMOL/L — SIGNIFICANT CHANGE UP (ref 3.5–5.3)
RBC # BLD: 3.5 M/UL — LOW (ref 4.2–5.8)
RBC # FLD: 17.1 % — HIGH (ref 10.3–14.5)
SARS-COV-2 RNA SPEC QL NAA+PROBE: SIGNIFICANT CHANGE UP
SODIUM SERPL-SCNC: 134 MMOL/L — LOW (ref 135–145)
WBC # BLD: 28.31 K/UL — HIGH (ref 3.8–10.5)
WBC # FLD AUTO: 28.31 K/UL — HIGH (ref 3.8–10.5)

## 2021-12-09 PROCEDURE — 83930 ASSAY OF BLOOD OSMOLALITY: CPT

## 2021-12-09 PROCEDURE — 94640 AIRWAY INHALATION TREATMENT: CPT

## 2021-12-09 PROCEDURE — C9399: CPT

## 2021-12-09 PROCEDURE — 36415 COLL VENOUS BLD VENIPUNCTURE: CPT

## 2021-12-09 PROCEDURE — 73551 X-RAY EXAM OF FEMUR 1: CPT

## 2021-12-09 PROCEDURE — 76000 FLUOROSCOPY <1 HR PHYS/QHP: CPT

## 2021-12-09 PROCEDURE — 84132 ASSAY OF SERUM POTASSIUM: CPT

## 2021-12-09 PROCEDURE — 85610 PROTHROMBIN TIME: CPT

## 2021-12-09 PROCEDURE — 87181 SC STD AGAR DILUTION PER AGT: CPT

## 2021-12-09 PROCEDURE — 87184 SC STD DISK METHOD PER PLATE: CPT

## 2021-12-09 PROCEDURE — 84145 PROCALCITONIN (PCT): CPT

## 2021-12-09 PROCEDURE — 84100 ASSAY OF PHOSPHORUS: CPT

## 2021-12-09 PROCEDURE — 86803 HEPATITIS C AB TEST: CPT

## 2021-12-09 PROCEDURE — 84300 ASSAY OF URINE SODIUM: CPT

## 2021-12-09 PROCEDURE — 97530 THERAPEUTIC ACTIVITIES: CPT

## 2021-12-09 PROCEDURE — 82962 GLUCOSE BLOOD TEST: CPT

## 2021-12-09 PROCEDURE — 93306 TTE W/DOPPLER COMPLETE: CPT

## 2021-12-09 PROCEDURE — 80048 BASIC METABOLIC PNL TOTAL CA: CPT

## 2021-12-09 PROCEDURE — 73501 X-RAY EXAM HIP UNI 1 VIEW: CPT

## 2021-12-09 PROCEDURE — 86850 RBC ANTIBODY SCREEN: CPT

## 2021-12-09 PROCEDURE — U0003: CPT

## 2021-12-09 PROCEDURE — 82947 ASSAY GLUCOSE BLOOD QUANT: CPT

## 2021-12-09 PROCEDURE — 82570 ASSAY OF URINE CREATININE: CPT

## 2021-12-09 PROCEDURE — C1889: CPT

## 2021-12-09 PROCEDURE — 87040 BLOOD CULTURE FOR BACTERIA: CPT

## 2021-12-09 PROCEDURE — 72125 CT NECK SPINE W/O DYE: CPT

## 2021-12-09 PROCEDURE — 97161 PT EVAL LOW COMPLEX 20 MIN: CPT

## 2021-12-09 PROCEDURE — 82565 ASSAY OF CREATININE: CPT

## 2021-12-09 PROCEDURE — 99232 SBSQ HOSP IP/OBS MODERATE 35: CPT

## 2021-12-09 PROCEDURE — U0005: CPT

## 2021-12-09 PROCEDURE — 85730 THROMBOPLASTIN TIME PARTIAL: CPT

## 2021-12-09 PROCEDURE — 87077 CULTURE AEROBIC IDENTIFY: CPT

## 2021-12-09 PROCEDURE — 83935 ASSAY OF URINE OSMOLALITY: CPT

## 2021-12-09 PROCEDURE — 70498 CT ANGIOGRAPHY NECK: CPT

## 2021-12-09 PROCEDURE — 85025 COMPLETE CBC W/AUTO DIFF WBC: CPT

## 2021-12-09 PROCEDURE — C1769: CPT

## 2021-12-09 PROCEDURE — 87070 CULTURE OTHR SPECIMN AEROBIC: CPT

## 2021-12-09 PROCEDURE — 82435 ASSAY OF BLOOD CHLORIDE: CPT

## 2021-12-09 PROCEDURE — 94660 CPAP INITIATION&MGMT: CPT

## 2021-12-09 PROCEDURE — 86901 BLOOD TYPING SEROLOGIC RH(D): CPT

## 2021-12-09 PROCEDURE — 83605 ASSAY OF LACTIC ACID: CPT

## 2021-12-09 PROCEDURE — 85014 HEMATOCRIT: CPT

## 2021-12-09 PROCEDURE — 85027 COMPLETE CBC AUTOMATED: CPT

## 2021-12-09 PROCEDURE — 82330 ASSAY OF CALCIUM: CPT

## 2021-12-09 PROCEDURE — 70450 CT HEAD/BRAIN W/O DYE: CPT

## 2021-12-09 PROCEDURE — C1713: CPT

## 2021-12-09 PROCEDURE — 97166 OT EVAL MOD COMPLEX 45 MIN: CPT

## 2021-12-09 PROCEDURE — 93005 ELECTROCARDIOGRAM TRACING: CPT

## 2021-12-09 PROCEDURE — 81001 URINALYSIS AUTO W/SCOPE: CPT

## 2021-12-09 PROCEDURE — 83735 ASSAY OF MAGNESIUM: CPT

## 2021-12-09 PROCEDURE — 71260 CT THORAX DX C+: CPT

## 2021-12-09 PROCEDURE — 88112 CYTOPATH CELL ENHANCE TECH: CPT

## 2021-12-09 PROCEDURE — 82550 ASSAY OF CK (CPK): CPT

## 2021-12-09 PROCEDURE — 87449 NOS EACH ORGANISM AG IA: CPT

## 2021-12-09 PROCEDURE — 71045 X-RAY EXAM CHEST 1 VIEW: CPT

## 2021-12-09 PROCEDURE — 84295 ASSAY OF SERUM SODIUM: CPT

## 2021-12-09 PROCEDURE — 85018 HEMOGLOBIN: CPT

## 2021-12-09 PROCEDURE — 82803 BLOOD GASES ANY COMBINATION: CPT

## 2021-12-09 PROCEDURE — 80076 HEPATIC FUNCTION PANEL: CPT

## 2021-12-09 PROCEDURE — 86900 BLOOD TYPING SEROLOGIC ABO: CPT

## 2021-12-09 PROCEDURE — 74177 CT ABD & PELVIS W/CONTRAST: CPT

## 2021-12-09 PROCEDURE — 71250 CT THORAX DX C-: CPT

## 2021-12-09 PROCEDURE — 97110 THERAPEUTIC EXERCISES: CPT

## 2021-12-09 RX ORDER — METOPROLOL TARTRATE 50 MG
1 TABLET ORAL
Qty: 0 | Refills: 0 | DISCHARGE
Start: 2021-12-09

## 2021-12-09 RX ORDER — ASCORBIC ACID 60 MG
1 TABLET,CHEWABLE ORAL
Qty: 0 | Refills: 0 | DISCHARGE
Start: 2021-12-09

## 2021-12-09 RX ORDER — NICOTINE POLACRILEX 2 MG
1 GUM BUCCAL
Qty: 0 | Refills: 0 | DISCHARGE
Start: 2021-12-09

## 2021-12-09 RX ORDER — TRIAMTERENE/HYDROCHLOROTHIAZID 75 MG-50MG
1 TABLET ORAL
Qty: 0 | Refills: 0 | DISCHARGE

## 2021-12-09 RX ORDER — DIVALPROEX SODIUM 500 MG/1
1000 TABLET, DELAYED RELEASE ORAL
Qty: 0 | Refills: 0 | DISCHARGE

## 2021-12-09 RX ORDER — IPRATROPIUM/ALBUTEROL SULFATE 18-103MCG
3 AEROSOL WITH ADAPTER (GRAM) INHALATION
Qty: 0 | Refills: 0 | DISCHARGE
Start: 2021-12-09

## 2021-12-09 RX ORDER — BUDESONIDE AND FORMOTEROL FUMARATE DIHYDRATE 160; 4.5 UG/1; UG/1
2 AEROSOL RESPIRATORY (INHALATION)
Qty: 0 | Refills: 0 | DISCHARGE
Start: 2021-12-09

## 2021-12-09 RX ORDER — ACETAMINOPHEN 500 MG
3 TABLET ORAL
Qty: 0 | Refills: 0 | DISCHARGE
Start: 2021-12-09

## 2021-12-09 RX ORDER — MULTIVIT WITH MIN/MFOLATE/K2 340-15/3 G
1 POWDER (GRAM) ORAL DAILY
Refills: 0 | Status: DISCONTINUED | OUTPATIENT
Start: 2021-12-09 | End: 2021-12-09

## 2021-12-09 RX ORDER — LIDOCAINE 4 G/100G
1 CREAM TOPICAL
Qty: 0 | Refills: 0 | DISCHARGE
Start: 2021-12-09

## 2021-12-09 RX ORDER — MULTIVIT WITH MIN/MFOLATE/K2 340-15/3 G
1 POWDER (GRAM) ORAL
Qty: 0 | Refills: 0 | DISCHARGE
Start: 2021-12-09

## 2021-12-09 RX ORDER — SENNA PLUS 8.6 MG/1
2 TABLET ORAL
Qty: 0 | Refills: 0 | DISCHARGE
Start: 2021-12-09

## 2021-12-09 RX ORDER — TRAMADOL HYDROCHLORIDE 50 MG/1
0.5 TABLET ORAL
Qty: 0 | Refills: 0 | DISCHARGE
Start: 2021-12-09

## 2021-12-09 RX ORDER — POLYETHYLENE GLYCOL 3350 17 G/17G
17 POWDER, FOR SOLUTION ORAL
Qty: 0 | Refills: 0 | DISCHARGE
Start: 2021-12-09

## 2021-12-09 RX ORDER — DIVALPROEX SODIUM 500 MG/1
2 TABLET, DELAYED RELEASE ORAL
Qty: 0 | Refills: 0 | DISCHARGE
Start: 2021-12-09

## 2021-12-09 RX ORDER — LACTULOSE 10 G/15ML
15 SOLUTION ORAL
Qty: 0 | Refills: 0 | DISCHARGE
Start: 2021-12-09

## 2021-12-09 RX ADMIN — Medication 25 GRAM(S): at 05:25

## 2021-12-09 RX ADMIN — LACTULOSE 10 GRAM(S): 10 SOLUTION ORAL at 05:24

## 2021-12-09 RX ADMIN — Medication 975 MILLIGRAM(S): at 05:23

## 2021-12-09 RX ADMIN — DIVALPROEX SODIUM 1000 MILLIGRAM(S): 500 TABLET, DELAYED RELEASE ORAL at 05:23

## 2021-12-09 RX ADMIN — Medication 10 MILLIGRAM(S): at 14:53

## 2021-12-09 RX ADMIN — Medication 975 MILLIGRAM(S): at 14:31

## 2021-12-09 RX ADMIN — Medication 25 MILLIGRAM(S): at 05:24

## 2021-12-09 RX ADMIN — ENOXAPARIN SODIUM 40 MILLIGRAM(S): 100 INJECTION SUBCUTANEOUS at 11:34

## 2021-12-09 RX ADMIN — Medication 500 MILLIGRAM(S): at 11:34

## 2021-12-09 RX ADMIN — Medication 975 MILLIGRAM(S): at 00:30

## 2021-12-09 RX ADMIN — TRAMADOL HYDROCHLORIDE 25 MILLIGRAM(S): 50 TABLET ORAL at 15:54

## 2021-12-09 RX ADMIN — TRAMADOL HYDROCHLORIDE 25 MILLIGRAM(S): 50 TABLET ORAL at 14:31

## 2021-12-09 RX ADMIN — POLYETHYLENE GLYCOL 3350 17 GRAM(S): 17 POWDER, FOR SOLUTION ORAL at 05:24

## 2021-12-09 RX ADMIN — LACTULOSE 10 GRAM(S): 10 SOLUTION ORAL at 11:34

## 2021-12-09 RX ADMIN — BUDESONIDE AND FORMOTEROL FUMARATE DIHYDRATE 2 PUFF(S): 160; 4.5 AEROSOL RESPIRATORY (INHALATION) at 05:24

## 2021-12-09 RX ADMIN — TRAMADOL HYDROCHLORIDE 50 MILLIGRAM(S): 50 TABLET ORAL at 05:23

## 2021-12-09 NOTE — PROGRESS NOTE ADULT - PROVIDER SPECIALTY LIST ADULT
Orthopedics
SICU
Trauma Surgery
Orthopedics
SICU
SICU
Surgery
Trauma Surgery
Trauma Surgery
Orthopedics
Orthopedics
SICU
Surgery
Trauma Surgery
Internal Medicine

## 2021-12-09 NOTE — DISCHARGE NOTE NURSING/CASE MANAGEMENT/SOCIAL WORK - NSDCFUADDAPPT_GEN_ALL_CORE_FT
Please follow up at the [facility] 1-2 weeks from discharge for incidental finding of left renal pelvis thickening and left retroperitoneal lymphadenopathy of unknown etiology on hospital imaging. [address] [place] [address]. [phone].

## 2021-12-09 NOTE — PROGRESS NOTE ADULT - PROBLEM SELECTOR PLAN 7
Suspect likely hypovolemic hyponatremia as Urine sodium <25.   - Trend BMP  - Encourage oral intake
Lovenox for DVT ppx
Lovenox for DVT ppx
Suspect likely hypovolemic hyponatremia as Urine sodium <25. Stable  - Trend BMP  - Encourage oral intake
Suspect likely hypovolemic hyponatremia as Urine sodium <25. Improving  - Trend BMP  - Encourage oral intake

## 2021-12-09 NOTE — PROGRESS NOTE ADULT - PROBLEM SELECTOR PLAN 1
PT/OT-WBAT RLE  IS  DVT PPx-per primary team  Pain Control  Continue Current Tx.  Ortho Stable  Plan per primary team    Kodi Landry PA-C  Team Pager: [phone]
Possibly 2/2 COPD exacerbation given cough with sputum production. Improving  - s/p systemic steroids, remains on Azithro  - c/w standing duonebs.   - Was prescribed Advair 500mg/50mg. Would restart.  - Wean O2 as tolerated
Possibly 2/2 COPD exacerbation given cough with sputum production. Improving, on 3L NC  - s/p systemic steroids, and Azithro  - Could change standing duonebs to Q6 PRN.   - c/w Symbicort  - Wean O2 as tolerated
Possibly 2/2 COPD exacerbation given cough with sputum production. Remains on HFNC this am  - Agree with systemic steroids and antibiotics  - c/w standing duonebs.   - Was prescribed Advair 500mg/50mg. Would restart.  - Wean O2 as tolerated
Possibly 2/2 COPD exacerbation given cough with sputum production. Improving  - s/p systemic steroids, remains on Azithro  - c/w standing duonebs spaced to Q6.   - c/w Symbicort  - Wean O2 as tolerated
Possibly 2/2 COPD exacerbation given cough with sputum production. Improving, on 3L NC  - s/p systemic steroids, and Azithro  - c/w standing duonebs spaced to Q6.   - c/w Symbicort  - Wean O2 as tolerated

## 2021-12-09 NOTE — PROGRESS NOTE ADULT - PROBLEM SELECTOR PROBLEM 5
Intertrochanteric fracture of right hip
Schizophrenia
Schizophrenia
Intertrochanteric fracture of right hip
Intertrochanteric fracture of right hip

## 2021-12-09 NOTE — PROGRESS NOTE ADULT - PROBLEM SELECTOR PLAN 5
Mood stable  - c/w Olanzapine and Depakote.
Seen on imaging. Ortho following. Pain controlled  - s/p R Hip IMN
Mood stable  - c/w Olanzapine and Depakote.
Seen on imaging. Ortho following  - s/p R Hip IMN
Seen on imaging. Ortho following. Pain controlled  - s/p R Hip IMN

## 2021-12-09 NOTE — PROGRESS NOTE ADULT - SUBJECTIVE AND OBJECTIVE BOX
Oscar Hernandez MD  Division of Hospital Medicine  Pager [phone]  If no response or off hours page: [phone]  ---------------------------------------------------------    CONNER PATINO  71y  Male      Patient is a 71y old  Male who presents with a chief complaint of R hip IM nail (09 Dec 2021 06:52)      INTERVAL HPI/OVERNIGHT EVENTS:  No overnight events. Breathing stable. Still no BM       REVIEW OF SYSTEMS: 10 point ROS negative unless listed above    T(C): 36.8 (21 @ 12:30), Max: 36.8 (21 @ 04:05)  HR: 70 (21 @ 12:30) (70 - 77)  BP: 135/82 (21 @ 12:30) (113/67 - 135/82)  RR: 18 (21 @ 12:30) (18 - 18)  SpO2: 95% (21 @ 12:30) (90% - 95%)  Wt(kg): --Vital Signs Last 24 Hrs  T(C): 36.8 (09 Dec 2021 12:30), Max: 36.8 (09 Dec 2021 04:05)  T(F): 98.3 (09 Dec 2021 12:30), Max: 98.3 (09 Dec 2021 04:05)  HR: 70 (09 Dec 2021 12:30) (70 - 77)  BP: 135/82 (09 Dec 2021 12:30) (113/67 - 135/82)  BP(mean): --  RR: 18 (09 Dec 2021 12:30) (18 - 18)  SpO2: 95% (09 Dec 2021 12:30) (90% - 95%)    PHYSICAL EXAM:  GENERAL: NAD  CHEST/LUNG: CTA B/L  HEART: Reg rate. No M/R/G.  ABDOMEN: Soft, NT/ND, Bowel sounds present  EXTREMITIES:  2+ Peripheral Pulses, No clubbing, cyanosis, or edema. RLE IM nail dressings C/D/I  PSYCH: AAOx3  NEUROLOGY: non-focal  SKIN: No rashes or lesions    Consultant(s) Notes Reviewed:  [x ] YES  [ ] NO  Care Discussed with Consultants/Other Providers [ x] YES  [ ] NO    LABS:                        9.9    28.31 )-----------( 471      ( 09 Dec 2021 07:12 )             31.0     12    134<L>  |  98  |  21  ----------------------------<  68<L>  4.1   |  25  |  0.71    Ca    8.8      09 Dec 2021 07:12  Phos  2.8       Mg     1.8             Urinalysis Basic - ( 07 Dec 2021 15:38 )    Color: Yellow / Appearance: Clear / S.023 / pH: x  Gluc: x / Ketone: Trace  / Bili: Negative / Urobili: Negative   Blood: x / Protein: Trace / Nitrite: Negative   Leuk Esterase: Negative / RBC: 7 /hpf / WBC 3 /HPF   Sq Epi: x / Non Sq Epi: 1 /hpf / Bacteria: Negative      CAPILLARY BLOOD GLUCOSE      POCT Blood Glucose.: 83 mg/dL (09 Dec 2021 08:42)        Urinalysis Basic - ( 07 Dec 2021 15:38 )    Color: Yellow / Appearance: Clear / S.023 / pH: x  Gluc: x / Ketone: Trace  / Bili: Negative / Urobili: Negative   Blood: x / Protein: Trace / Nitrite: Negative   Leuk Esterase: Negative / RBC: 7 /hpf / WBC 3 /HPF   Sq Epi: x / Non Sq Epi: 1 /hpf / Bacteria: Negative        RADIOLOGY & ADDITIONAL TESTS:    Imaging Personally Reviewed:  [ ] YES  [ ] NO

## 2021-12-09 NOTE — PROGRESS NOTE ADULT - PROBLEM SELECTOR PLAN 3
Was prescribed Norvasc 10mg, Enalapril 20mg, Dyazide 25mg-37.5mg, Clonidine 0.3mg. BP currently increasing  - Would resume Norvasc 10mg
Grade III seen on imaging. H/H relatively stable  - Care as per surgery.
Was prescribed Norvasc 10mg, Enalapril 20mg, Dyazide 25mg-37.5mg, Clonidine 0.3mg. BP currently increasing  - Would increase Norvasc to 10mg

## 2021-12-09 NOTE — DISCHARGE NOTE NURSING/CASE MANAGEMENT/SOCIAL WORK - NSDCPEFALRISK_GEN_ALL_CORE
For information on Fall & Injury Prevention, visit: [email or web address] OR  [email or web address] OR  https://www.cdc.gov/steadi/patient.html

## 2021-12-09 NOTE — PROGRESS NOTE ADULT - SUBJECTIVE AND OBJECTIVE BOX
Post op Day [5]  T(C): 36.8 (12-09-21 @ 04:05), Max: 36.8 (12-09-21 @ 04:05)  HR: 75 (12-09-21 @ 04:05) (68 - 79)  BP: 123/74 (12-09-21 @ 04:05) (113/67 - 129/80)  RR: 18 (12-09-21 @ 04:05) (17 - 18)  SpO2: 95% (12-09-21 @ 04:05) (89% - 95%)  Wt(kg): --    Exam:  Alert and Oriented, No Acute Distress  In C-Collar  RLE dsgs c/d/i with soft/compressible compartments  Calves Soft, Non-tender bilaterally  +PF/DF/EHL/FHL  SILT  +DP Pulse                          9.5    32.22 )-----------( 470      ( 08 Dec 2021 06:58 )             29.9    12-08    134<L>  |  98  |  25<H>  ----------------------------<  73  3.9   |  23  |  0.72    Ca    8.6      08 Dec 2021 06:55  Phos  3.1     12-08  Mg     1.8     12-08

## 2021-12-09 NOTE — PROGRESS NOTE ADULT - PROBLEM SELECTOR PLAN 6
Suspect likely hypovolemic hyponatremia as Urine sodium <25.   - Trend BMP
Mood stable  - c/w Olanzapine and Depakote.
Mood stable  - c/w Olanzapine and Depakote.
Suspect likely hypovolemic hyponatremia as Urine sodium <25.   - Improving with IVF  - Trend BMP
Mood stable  - c/w Olanzapine and Depakote.

## 2021-12-09 NOTE — PROGRESS NOTE ADULT - SUBJECTIVE AND OBJECTIVE BOX
SURGERY DAILY PROGRESS NOTE    STATUS POST:  Placement of trochanteric nail into right hip        SUBJECTIVE: Pt seen and examined at bedside. WBC downtrending to 28 today, UA and bl cx Neg. Still denies BM. His pain is controlled. He denies CP, SOB, fevers, chills, N/V/D.      OBJECTIVE:  Vital Signs Last 24 Hrs  T(C): 36.8 (09 Dec 2021 12:30), Max: 36.8 (09 Dec 2021 04:05)  T(F): 98.3 (09 Dec 2021 12:30), Max: 98.3 (09 Dec 2021 04:05)  HR: 70 (09 Dec 2021 12:30) (70 - 77)  BP: 135/82 (09 Dec 2021 12:30) (113/67 - 135/82)  BP(mean): --  RR: 18 (09 Dec 2021 12:30) (18 - 18)  SpO2: 95% (09 Dec 2021 12:30) (90% - 95%)    I&O's Summary    08 Dec 2021 07:01  -  09 Dec 2021 07:00  --------------------------------------------------------  IN: 240 mL / OUT: 650 mL / NET: -410 mL    09 Dec 2021 07:01  -  09 Dec 2021 14:18  --------------------------------------------------------  IN: 360 mL / OUT: 450 mL / NET: -90 mL        Physical Exam:  General:  AAOx3 in NAD  Neck:  Supple, FOM, no palpable masses  HEENT:  Normocephalic, atraumatic, nonicteric sclera, MMM   Chest:  Equal expansion bilaterally, equal breath sounds  Abdomen:  Soft, nondistended, nontender to palpation in all four quadrants     LABS:                        9.9    28.31 )-----------( 471      ( 09 Dec 2021 07:12 )             31.0     12-09    134<L>  |  98  |  21  ----------------------------<  68<L>  4.1   |  25  |  0.71    Ca    8.8      09 Dec 2021 07:12  Phos  2.8       Mg     1.8             Urinalysis Basic - ( 07 Dec 2021 15:38 )    Color: Yellow / Appearance: Clear / S.023 / pH: x  Gluc: x / Ketone: Trace  / Bili: Negative / Urobili: Negative   Blood: x / Protein: Trace / Nitrite: Negative   Leuk Esterase: Negative / RBC: 7 /hpf / WBC 3 /HPF   Sq Epi: x / Non Sq Epi: 1 /hpf / Bacteria: Negative        RADIOLOGY & ADDITIONAL STUDIES:

## 2021-12-09 NOTE — PROGRESS NOTE ADULT - PROBLEM SELECTOR PLAN 4
Seen on imaging. Ortho following  - Plan for OR as per ortho  - Not yet optimized from respiratory standpoint
Was prescribed Norvasc 10mg, Enalapril 20mg, Dyazide 25mg-37.5mg, Clonidine 0.3mg.   - c/w Norvasc 10mg
Was prescribed Norvasc 10mg, Enalapril 20mg, Dyazide 25mg-37.5mg, Clonidine 0.3mg.   - Would increase Norvasc to 10mg
Was prescribed Norvasc 10mg, Enalapril 20mg, Dyazide 25mg-37.5mg, Clonidine 0.3mg.   - c/w Norvasc 10mg  - Hold Enalapril, Dyazide and Clonidine on d/c
Seen on imaging. Ortho following  - s/p R Hip IMN

## 2021-12-09 NOTE — PROGRESS NOTE ADULT - PROBLEM SELECTOR PLAN 2
With rising leukocytosis, WBC 38 today. s/p systemic steroids but leukocytosis response seems out of proportion. All cell lines increased so there could also be a compnent of emoconcentration  - Agree with ruling out infection with UA, CXR, Bcx  - COuld check procalcitonin  - Monitoring off abx at this time
Grade III seen on imaging. H/H relatively stable  - Care as per surgery.
With rise leukocytosis now peaked and downtrending, WBC 28 today. s/p systemic steroids but leukocytosis response seems out of proportion. No focal signs of infection but is constipated  - UA negative, CXR clear, Bcx currently NGTD  - Procalcitonin WNL  - Monitoring off abx at this time
Grade III seen on imaging. H/H relatively stable  - Care as per surgery.
With rising leukocytosis yesterday, WBC 32 today. s/p systemic steroids but leukocytosis response seems out of proportion. All cell lines increased so there could also be a component of hemoconcentration  - UA negative, CXR clear, Bcx currently NGTD  - Procalcitonin WNL  - Monitoring off abx at this time

## 2021-12-09 NOTE — PROGRESS NOTE ADULT - PROBLEM SELECTOR PROBLEM 4
Intertrochanteric fracture of right hip
Essential hypertension
Essential hypertension
Intertrochanteric fracture of right hip
Essential hypertension

## 2021-12-09 NOTE — PROGRESS NOTE ADULT - ASSESSMENT
70 y/o male w/ a PMHx of schizophrenia, HTN, current smoker of 1/2 PPD, and COPD presenting s/p fall w/ a left C3 facet fracture, right 8th-10th rib fractures w/ associated hemopneumothorax & pneumomediastinum, and a grade 3 liver laceration with a hospital course c/b acute hypoxemic respiratory failure likely 2/2 a COPD exacerbation. S/p R IMN w/ ortho. UA negative. Blood cx negative, WBC downtrending.    Plan:  - DVT PPX: LVX  --Diet: Regular  - OOBAT, IS  - wean off HFNC  - f/u AM labs, monitor H&H  - multimodal pain control PRN  - PT consult: [facility]  - bowel regimen  - Dispo: dc to [facility] after BM      ACS  [id number]    72 y/o male w/ a PMHx of schizophrenia, HTN, current smoker of 1/2 PPD, and COPD presenting s/p fall w/ a left C3 facet fracture, right 8th-10th rib fractures w/ associated hemopneumothorax & pneumomediastinum, and a grade 3 liver laceration with a hospital course c/b acute hypoxemic respiratory failure likely 2/2 a COPD exacerbation. S/p R IMN w/ ortho. UA negative. Blood cx negative, WBC downtrending.    Plan:  - DVT PPX: LVX  --Diet: Regular  - OOBAT, IS  - wean off HFNC  - f/u AM labs, monitor H&H  - multimodal pain control PRN  - PT consult: [facility]  - aggressive bowel regimen ordered for chronic constipation  - Dispo: dc to [facility] today      ACS  [id number]

## 2021-12-09 NOTE — DISCHARGE NOTE NURSING/CASE MANAGEMENT/SOCIAL WORK - PATIENT PORTAL LINK FT
I will STOP taking the medications listed below when I get home from the hospital:  None You can access the FollowMyHealth Patient Portal offered by [facility] by registering at the following website: [email or web address]. By joining [employer]’s FollowMyHealth portal, you will also be able to view your health information using other applications (apps) compatible with our system.

## 2021-12-09 NOTE — PROGRESS NOTE ADULT - PROBLEM SELECTOR PROBLEM 1
Acute respiratory failure with hypoxia
Acute respiratory failure with hypoxia
Intertrochanteric fracture of right hip
Acute respiratory failure with hypoxia

## 2021-12-12 LAB
CULTURE RESULTS: SIGNIFICANT CHANGE UP
CULTURE RESULTS: SIGNIFICANT CHANGE UP
SPECIMEN SOURCE: SIGNIFICANT CHANGE UP
SPECIMEN SOURCE: SIGNIFICANT CHANGE UP

## 2021-12-16 PROBLEM — Z00.00 ENCOUNTER FOR PREVENTIVE HEALTH EXAMINATION: Status: ACTIVE | Noted: 2021-12-16

## 2021-12-22 ENCOUNTER — APPOINTMENT (OUTPATIENT)
Dept: TRAUMA SURGERY | Facility: CLINIC | Age: 71
End: 2021-12-22
Payer: MEDICARE

## 2021-12-22 DIAGNOSIS — S36.113A LACERATION OF LIVER, UNSPECIFIED DEGREE, INITIAL ENCOUNTER: ICD-10-CM

## 2021-12-22 DIAGNOSIS — Z86.79 PERSONAL HISTORY OF OTHER DISEASES OF THE CIRCULATORY SYSTEM: ICD-10-CM

## 2021-12-22 DIAGNOSIS — Z87.09 PERSONAL HISTORY OF OTHER DISEASES OF THE RESPIRATORY SYSTEM: ICD-10-CM

## 2021-12-22 DIAGNOSIS — Z86.59 PERSONAL HISTORY OF OTHER MENTAL AND BEHAVIORAL DISORDERS: ICD-10-CM

## 2021-12-22 DIAGNOSIS — F17.200 NICOTINE DEPENDENCE, UNSPECIFIED, UNCOMPLICATED: ICD-10-CM

## 2021-12-22 PROCEDURE — 99211 OFF/OP EST MAY X REQ PHY/QHP: CPT

## 2022-01-02 PROBLEM — Z86.79 HISTORY OF HYPERTENSION: Status: RESOLVED | Noted: 2022-01-02 | Resolved: 2022-01-02

## 2022-01-02 PROBLEM — S36.113A LIVER LACERATION: Status: RESOLVED | Noted: 2022-01-02 | Resolved: 2022-01-02

## 2022-01-02 PROBLEM — F17.200 SMOKING ADDICTION: Status: ACTIVE | Noted: 2022-01-02

## 2022-01-02 PROBLEM — Z86.59 HISTORY OF SCHIZOPHRENIA: Status: RESOLVED | Noted: 2022-01-02 | Resolved: 2022-01-02

## 2022-01-02 PROBLEM — Z87.09 HISTORY OF CHRONIC OBSTRUCTIVE LUNG DISEASE: Status: RESOLVED | Noted: 2022-01-02 | Resolved: 2022-01-02

## 2022-01-02 NOTE — HISTORY OF PRESENT ILLNESS
[FreeTextEntry1] : 72 yo male with schizophrenia, HTN, smoking and COPD. S/p fall with R intertrochanteric fracture, right 4th rib fracture, hemopneumothorax, pneumomediastinum, left C3 facet fracture and grade 3 liver laceration. S/p intertrochanteric nail. Urology consulted for left renal pelvis thickening and retroperitoneal lymph node, urine cytology negative. Pulmonology consulted for COPD management. NSY consulted for C3 fracture. Had SICU admission for hypoxia and HFNC. Discharged on HD 8.

## 2022-01-02 NOTE — PHYSICAL EXAM
[Normal Breath Sounds] : Normal breath sounds [Normal Heart Sounds] : normal heart sounds [Alert] : alert [Oriented to Person] : oriented to person [Oriented to Place] : oriented to place [Oriented to Time] : oriented to time [Calm] : calm [JVD] : no jugular venous distention  [Tender] : nontender [Enlarged] : not enlarged [de-identified] : No acute distress. [de-identified] : Abdomen soft nontender non distended. [de-identified] : Mild pain to palpation on R chest.

## 2022-01-02 NOTE — REVIEW OF SYSTEMS
[Negative] : Heme/Lymph [FreeTextEntry6] : Pain on deep inspiration. 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

## 2022-01-02 NOTE — ASSESSMENT
[FreeTextEntry1] : No further work up needed from ACS, no concern regarding liver laceration. Recommended continue with IS. Has follow up with neurosurgery, orthopedics, urology and PCP for COPD management. Instructed to come to the hospital if he has any respiratory symptoms.

## 2022-01-13 ENCOUNTER — APPOINTMENT (OUTPATIENT)
Dept: ORTHOPEDIC SURGERY | Facility: CLINIC | Age: 72
End: 2022-01-13

## 2022-01-18 ENCOUNTER — APPOINTMENT (OUTPATIENT)
Dept: NEUROSURGERY | Facility: CLINIC | Age: 72
End: 2022-01-18

## 2022-02-14 ENCOUNTER — APPOINTMENT (OUTPATIENT)
Dept: ORTHOPEDIC SURGERY | Facility: CLINIC | Age: 72
End: 2022-02-14
Payer: MEDICARE

## 2022-02-14 DIAGNOSIS — S72.141D DISPLACED INTERTROCHANTERIC FRACTURE OF RIGHT FEMUR, SUBSEQUENT ENCOUNTER FOR CLOSED FRACTURE WITH ROUTINE HEALING: ICD-10-CM

## 2022-02-14 PROCEDURE — 99024 POSTOP FOLLOW-UP VISIT: CPT

## 2022-02-14 PROCEDURE — 73502 X-RAY EXAM HIP UNI 2-3 VIEWS: CPT | Mod: RT

## 2022-02-14 NOTE — HISTORY OF PRESENT ILLNESS
[Chills] : no chills [Fever] : no fever [de-identified] : The patient is S/p ORIF of the right interochanteric fracture with synthes short trochanteric femoral nail. DOS 12/4/21.He is at [facility] [de-identified] : S/p ORIF of the right interochanteric fracture with synthes sort trochanteric femoral nail. DOS 12/4/21 [de-identified] : Xrays of the right hip AP,Lat reveal a well aligned right intertrochanteric hip fracture.Fracture is healing well.Short TFN in place. [de-identified] : WDWN elderly gentleman.\par He is in a wheelchair.\par Right hip ROM with no pain\par Right ankle FROM [de-identified] : PT right leg WBAT\par Return in 3 months

## 2022-02-15 VITALS — BODY MASS INDEX: 34.93 KG/M2 | HEIGHT: 61 IN | WEIGHT: 185 LBS

## 2022-05-16 ENCOUNTER — APPOINTMENT (OUTPATIENT)
Dept: ORTHOPEDIC SURGERY | Facility: CLINIC | Age: 72
End: 2022-05-16

## 2022-08-16 NOTE — DISCHARGE NOTE NURSING/CASE MANAGEMENT/SOCIAL WORK - NSDCPEPTCAREGIVEDUMATLIST _GEN_ALL_CORE
Influenza Vaccination
[FreeTextEntry1] : 69 yo M for f/u. \par \par Foot Pain. Xray ordered. F/u w/ ortho discussed. \par \par Cerumen Impaction. ENT referral ordered. \par \par Refills sent. Pt will do blood work at f/u visit. All questions answered. Pt voiced understanding and in agreement to plan. RTC as recommended.

## 2024-04-30 NOTE — PROGRESS NOTE ADULT - PROBLEM SELECTOR PLAN 8
Bowel regimen increased. Would give additional mag citrate today  - Refusing enema Bowel regimen increased. Would give additional mag citrate today  - Refusing enema  - Can be discharged to rehab on aggressive bowel regimen if doesn't have a BM as pt is chronically constipated VIEW ALL
